# Patient Record
Sex: FEMALE | Race: WHITE | NOT HISPANIC OR LATINO | Employment: FULL TIME | URBAN - METROPOLITAN AREA
[De-identification: names, ages, dates, MRNs, and addresses within clinical notes are randomized per-mention and may not be internally consistent; named-entity substitution may affect disease eponyms.]

---

## 2021-02-02 ENCOUNTER — HOSPITAL ENCOUNTER (INPATIENT)
Facility: HOSPITAL | Age: 56
LOS: 4 days | Discharge: HOME | End: 2021-02-06
Attending: EMERGENCY MEDICINE | Admitting: INTERNAL MEDICINE

## 2021-02-02 DIAGNOSIS — R03.0 ELEVATED BLOOD PRESSURE READING: ICD-10-CM

## 2021-02-02 DIAGNOSIS — F32.A DEPRESSION WITH SUICIDAL IDEATION: ICD-10-CM

## 2021-02-02 DIAGNOSIS — F10.10 ALCOHOL ABUSE: ICD-10-CM

## 2021-02-02 DIAGNOSIS — F10.932: Primary | ICD-10-CM

## 2021-02-02 DIAGNOSIS — R45.851 DEPRESSION WITH SUICIDAL IDEATION: ICD-10-CM

## 2021-02-02 PROBLEM — F10.939 ALCOHOL WITHDRAWAL (HCC): Status: ACTIVE | Noted: 2021-02-02

## 2021-02-02 PROBLEM — F17.200 SMOKING: Chronic | Status: ACTIVE | Noted: 2021-02-02

## 2021-02-02 LAB
ALBUMIN SERPL-MCNC: 4.8 G/DL (ref 3.5–5.2)
ALBUMIN/GLOB SERPL: 1.8 G/DL
ALP SERPL-CCNC: 108 U/L (ref 39–117)
ALT SERPL W P-5'-P-CCNC: 73 U/L (ref 1–33)
AMPHET+METHAMPHET UR QL: NEGATIVE
AMPHETAMINES UR QL: NEGATIVE
ANION GAP SERPL CALCULATED.3IONS-SCNC: 15 MMOL/L (ref 5–15)
APAP SERPL-MCNC: <5 MCG/ML (ref 0–30)
AST SERPL-CCNC: 82 U/L (ref 1–32)
B-HCG UR QL: NEGATIVE
BACTERIA UR QL AUTO: NORMAL /HPF
BARBITURATES UR QL SCN: NEGATIVE
BASOPHILS # BLD AUTO: 0.02 10*3/MM3 (ref 0–0.2)
BASOPHILS NFR BLD AUTO: 0.5 % (ref 0–1.5)
BENZODIAZ UR QL SCN: NEGATIVE
BILIRUB SERPL-MCNC: 0.8 MG/DL (ref 0–1.2)
BILIRUB UR QL STRIP: NEGATIVE
BUN SERPL-MCNC: 9 MG/DL (ref 6–20)
BUN/CREAT SERPL: 11 (ref 7–25)
BUPRENORPHINE SERPL-MCNC: NEGATIVE NG/ML
CALCIUM SPEC-SCNC: 10.8 MG/DL (ref 8.6–10.5)
CANNABINOIDS SERPL QL: NEGATIVE
CHLORIDE SERPL-SCNC: 94 MMOL/L (ref 98–107)
CLARITY UR: ABNORMAL
CO2 SERPL-SCNC: 29 MMOL/L (ref 22–29)
COCAINE UR QL: NEGATIVE
COLOR UR: YELLOW
CREAT SERPL-MCNC: 0.82 MG/DL (ref 0.57–1)
D-LACTATE SERPL-SCNC: 0.9 MMOL/L (ref 0.5–2)
D-LACTATE SERPL-SCNC: 4 MMOL/L (ref 0.5–2)
DEPRECATED RDW RBC AUTO: 42.1 FL (ref 37–54)
EOSINOPHIL # BLD AUTO: 0.01 10*3/MM3 (ref 0–0.4)
EOSINOPHIL NFR BLD AUTO: 0.3 % (ref 0.3–6.2)
ERYTHROCYTE [DISTWIDTH] IN BLOOD BY AUTOMATED COUNT: 13.1 % (ref 12.3–15.4)
ETHANOL BLD-MCNC: <10 MG/DL (ref 0–10)
GFR SERPL CREATININE-BSD FRML MDRD: 72 ML/MIN/1.73
GLOBULIN UR ELPH-MCNC: 2.7 GM/DL
GLUCOSE SERPL-MCNC: 111 MG/DL (ref 65–99)
GLUCOSE UR STRIP-MCNC: NEGATIVE MG/DL
HCT VFR BLD AUTO: 45.6 % (ref 34–46.6)
HGB BLD-MCNC: 15.3 G/DL (ref 12–15.9)
HGB UR QL STRIP.AUTO: NEGATIVE
HOLD SPECIMEN: NORMAL
HYALINE CASTS UR QL AUTO: NORMAL /LPF
IMM GRANULOCYTES # BLD AUTO: 0.01 10*3/MM3 (ref 0–0.05)
IMM GRANULOCYTES NFR BLD AUTO: 0.3 % (ref 0–0.5)
INTERNAL NEGATIVE CONTROL: NEGATIVE
INTERNAL POSITIVE CONTROL: POSITIVE
KETONES UR QL STRIP: NEGATIVE
LACTATE HOLD SPECIMEN: NORMAL
LEUKOCYTE ESTERASE UR QL STRIP.AUTO: ABNORMAL
LIPASE SERPL-CCNC: 67 U/L (ref 13–60)
LYMPHOCYTES # BLD AUTO: 0.86 10*3/MM3 (ref 0.7–3.1)
LYMPHOCYTES NFR BLD AUTO: 23.1 % (ref 19.6–45.3)
Lab: NORMAL
MCH RBC QN AUTO: 29.4 PG (ref 26.6–33)
MCHC RBC AUTO-ENTMCNC: 33.6 G/DL (ref 31.5–35.7)
MCV RBC AUTO: 87.5 FL (ref 79–97)
METHADONE UR QL SCN: NEGATIVE
MONOCYTES # BLD AUTO: 0.45 10*3/MM3 (ref 0.1–0.9)
MONOCYTES NFR BLD AUTO: 12.1 % (ref 5–12)
NEUTROPHILS NFR BLD AUTO: 2.37 10*3/MM3 (ref 1.7–7)
NEUTROPHILS NFR BLD AUTO: 63.7 % (ref 42.7–76)
NITRITE UR QL STRIP: NEGATIVE
NRBC BLD AUTO-RTO: 0 /100 WBC (ref 0–0.2)
OPIATES UR QL: NEGATIVE
OXYCODONE UR QL SCN: NEGATIVE
PCP UR QL SCN: NEGATIVE
PH UR STRIP.AUTO: 8.5 [PH] (ref 5–8)
PLATELET # BLD AUTO: 146 10*3/MM3 (ref 140–450)
PMV BLD AUTO: 9.4 FL (ref 6–12)
POTASSIUM SERPL-SCNC: 4.1 MMOL/L (ref 3.5–5.2)
PROPOXYPH UR QL: NEGATIVE
PROT SERPL-MCNC: 7.5 G/DL (ref 6–8.5)
PROT UR QL STRIP: NEGATIVE
RBC # BLD AUTO: 5.21 10*6/MM3 (ref 3.77–5.28)
RBC # UR: NORMAL /HPF
REF LAB TEST METHOD: NORMAL
SALICYLATES SERPL-MCNC: <0.3 MG/DL
SODIUM SERPL-SCNC: 138 MMOL/L (ref 136–145)
SP GR UR STRIP: 1.01 (ref 1–1.03)
SQUAMOUS #/AREA URNS HPF: NORMAL /HPF
TRICYCLICS UR QL SCN: NEGATIVE
UROBILINOGEN UR QL STRIP: ABNORMAL
WBC # BLD AUTO: 3.72 10*3/MM3 (ref 3.4–10.8)
WBC UR QL AUTO: NORMAL /HPF
WHOLE BLOOD HOLD SPECIMEN: NORMAL
WHOLE BLOOD HOLD SPECIMEN: NORMAL

## 2021-02-02 PROCEDURE — 80143 DRUG ASSAY ACETAMINOPHEN: CPT | Performed by: EMERGENCY MEDICINE

## 2021-02-02 PROCEDURE — 83605 ASSAY OF LACTIC ACID: CPT | Performed by: EMERGENCY MEDICINE

## 2021-02-02 PROCEDURE — 25010000002 DIAZEPAM PER 5 MG: Performed by: EMERGENCY MEDICINE

## 2021-02-02 PROCEDURE — 87636 SARSCOV2 & INF A&B AMP PRB: CPT | Performed by: INTERNAL MEDICINE

## 2021-02-02 PROCEDURE — 81025 URINE PREGNANCY TEST: CPT | Performed by: EMERGENCY MEDICINE

## 2021-02-02 PROCEDURE — 83605 ASSAY OF LACTIC ACID: CPT

## 2021-02-02 PROCEDURE — 99284 EMERGENCY DEPT VISIT MOD MDM: CPT

## 2021-02-02 PROCEDURE — 85025 COMPLETE CBC W/AUTO DIFF WBC: CPT

## 2021-02-02 PROCEDURE — 82077 ASSAY SPEC XCP UR&BREATH IA: CPT

## 2021-02-02 PROCEDURE — 25010000002 THIAMINE PER 100 MG: Performed by: EMERGENCY MEDICINE

## 2021-02-02 PROCEDURE — 80053 COMPREHEN METABOLIC PANEL: CPT

## 2021-02-02 PROCEDURE — 99283 EMERGENCY DEPT VISIT LOW MDM: CPT

## 2021-02-02 PROCEDURE — 99223 1ST HOSP IP/OBS HIGH 75: CPT | Performed by: INTERNAL MEDICINE

## 2021-02-02 PROCEDURE — 83690 ASSAY OF LIPASE: CPT

## 2021-02-02 PROCEDURE — 81001 URINALYSIS AUTO W/SCOPE: CPT | Performed by: EMERGENCY MEDICINE

## 2021-02-02 PROCEDURE — 80306 DRUG TEST PRSMV INSTRMNT: CPT | Performed by: EMERGENCY MEDICINE

## 2021-02-02 PROCEDURE — 25010000002 LORAZEPAM PER 2 MG: Performed by: EMERGENCY MEDICINE

## 2021-02-02 PROCEDURE — 80179 DRUG ASSAY SALICYLATE: CPT | Performed by: EMERGENCY MEDICINE

## 2021-02-02 RX ORDER — LORAZEPAM 2 MG/ML
1 INJECTION INTRAMUSCULAR ONCE
Status: COMPLETED | OUTPATIENT
Start: 2021-02-02 | End: 2021-02-02

## 2021-02-02 RX ORDER — LORAZEPAM 2 MG/ML
0.5 INJECTION INTRAMUSCULAR EVERY 8 HOURS
Status: DISCONTINUED | OUTPATIENT
Start: 2021-02-04 | End: 2021-02-03

## 2021-02-02 RX ORDER — ESCITALOPRAM OXALATE 10 MG/1
10 TABLET ORAL DAILY
COMMUNITY
End: 2021-02-06 | Stop reason: HOSPADM

## 2021-02-02 RX ORDER — LORAZEPAM 2 MG/ML
1 INJECTION INTRAMUSCULAR
Status: DISCONTINUED | OUTPATIENT
Start: 2021-02-02 | End: 2021-02-03

## 2021-02-02 RX ORDER — DIAZEPAM 5 MG/ML
5 INJECTION, SOLUTION INTRAMUSCULAR; INTRAVENOUS ONCE
Status: COMPLETED | OUTPATIENT
Start: 2021-02-02 | End: 2021-02-02

## 2021-02-02 RX ORDER — LORAZEPAM 2 MG/ML
0.5 INJECTION INTRAMUSCULAR EVERY 6 HOURS SCHEDULED
Status: DISCONTINUED | OUTPATIENT
Start: 2021-02-02 | End: 2021-02-03

## 2021-02-02 RX ORDER — LORAZEPAM 0.5 MG/1
0.5 TABLET ORAL
Status: DISCONTINUED | OUTPATIENT
Start: 2021-02-02 | End: 2021-02-06 | Stop reason: HOSPADM

## 2021-02-02 RX ORDER — LORAZEPAM 2 MG/ML
0.5 INJECTION INTRAMUSCULAR
Status: DISCONTINUED | OUTPATIENT
Start: 2021-02-02 | End: 2021-02-03

## 2021-02-02 RX ORDER — SODIUM CHLORIDE 9 MG/ML
10 INJECTION INTRAVENOUS AS NEEDED
Status: DISCONTINUED | OUTPATIENT
Start: 2021-02-02 | End: 2021-02-06 | Stop reason: HOSPADM

## 2021-02-02 RX ORDER — LORAZEPAM 1 MG/1
1 TABLET ORAL
Status: DISCONTINUED | OUTPATIENT
Start: 2021-02-02 | End: 2021-02-06 | Stop reason: HOSPADM

## 2021-02-02 RX ADMIN — LORAZEPAM 1 MG: 2 INJECTION INTRAMUSCULAR; INTRAVENOUS at 22:52

## 2021-02-02 RX ADMIN — DIAZEPAM 5 MG: 5 INJECTION, SOLUTION INTRAMUSCULAR; INTRAVENOUS at 18:13

## 2021-02-02 RX ADMIN — LORAZEPAM 1 MG: 2 INJECTION INTRAMUSCULAR; INTRAVENOUS at 19:24

## 2021-02-02 RX ADMIN — SODIUM CHLORIDE, POTASSIUM CHLORIDE, SODIUM LACTATE AND CALCIUM CHLORIDE 1000 ML: 600; 310; 30; 20 INJECTION, SOLUTION INTRAVENOUS at 18:08

## 2021-02-02 RX ADMIN — THIAMINE HYDROCHLORIDE 1000 ML/HR: 100 INJECTION, SOLUTION INTRAMUSCULAR; INTRAVENOUS at 18:37

## 2021-02-02 NOTE — PROGRESS NOTES
"Continued Stay Note  [facility]     Patient Name: Tierney Mas  MRN: [id number]  Today's Date: 2/2/2021    Admit Date: 2/2/2021    Discharge Plan     Row Name 02/02/21 1840       Plan    Plan  Ongoing    Plan Comments  Followed up with patient at bedside. She stated that she is feeling better; however, she still has visible tremors. She is still unwilling to pursue inpatient AUD treatment. I did explain Vivitrol and the benefits of using it for AUD. I provided her with a packet and ensured the clinics that provide Vivitrol were marked. We also discussed [facility] and their services again. The patient was willing to sign up for Telephone Recovery Support and a . Provided Dr. Ram with the information. He stated he believes she will need to be admitted to the hospital for at least one night of observation. He will be completing a CIWA. I went back and spoke to the patient who is agreeable with being admitted, if it is determined to be necessary for her health.    Row Name 02/02/21 4093       Plan    Plan Ongoing    Plan Comments Spoke to patient in Consult Room. She was in a wheelchair due to being unsteady walking. She has visible tremors, over most of her body. She complained of being cold, but also sweating. She reported that she went to AUD treatment in 2014. She was able to maintain five years of sobriety; however, she then \"slipped up\" about a year ago. She stated this was due to her anxiety. She admitted that she has been drinking a case of White Claws every day. She did not feel this would be a problem due to the alcohol content. She said that a friend came over this past weekend and she also drank an undetermined amout of whiskey. She has not been feeling well since then. She is not interested in inpatient AUD treatment. 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 We briefly discussed other AUD treatment options, including the services " offered by [facility]. I will continue to follow her througout her stay in the ED.        Discharge Codes    No documentation.             Raiza Villalobos     Chemical Dependency Team  Ext. [phone]

## 2021-02-02 NOTE — PROGRESS NOTES
"Continued Stay Note  [facility]     Patient Name: Tierney Mas  MRN: [id number]  Today's Date: 2/2/2021    Admit Date: (Not on file)    Discharge Plan     Row Name 02/02/21 8959       Plan    Plan Ongoing    Plan Comments Spoke to patient in Consult Room. She was in a wheelchair due to being unsteady walking. She has visible tremors, over most of her body. She complained of being cold, but also sweating. She reported that she went to AUD treatment in 2014. She was able to maintain five years of sobriety; however, she then \"slipped up\" about a year ago. She stated this was due to her anxiety. She admitted that she has been drinking a case of White Claws every day. She did not feel this would be a problem due to the alcohol content. She said that a friend came over this past weekend and she also drank an undetermined amout of whiskey. She has not been feeling well since then. She is not interested in inpatient AUD treatment. 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 We briefly discussed other AUD treatment options, including the services offered by [facility]. I will continue to follow her througout her stay in the ED.    She also reported that she had surgery on her foot in December 2020. She has been staying at home alone, which is also a factor in her alcohol consumption.        Discharge Codes    No documentation.             Raiza Villalobos     Chemical Dependency Team  Ext. [phone]    "

## 2021-02-03 ENCOUNTER — APPOINTMENT (OUTPATIENT)
Dept: GENERAL RADIOLOGY | Facility: HOSPITAL | Age: 56
End: 2021-02-03

## 2021-02-03 ENCOUNTER — TELEPHONE (OUTPATIENT)
Dept: PSYCHIATRY | Facility: CLINIC | Age: 56
End: 2021-02-03

## 2021-02-03 PROBLEM — R45.851 SUICIDAL IDEATIONS: Status: ACTIVE | Noted: 2021-02-03

## 2021-02-03 PROBLEM — F32.A DEPRESSION: Status: ACTIVE | Noted: 2021-02-03

## 2021-02-03 LAB
ALBUMIN SERPL-MCNC: 4 G/DL (ref 3.5–5.2)
ALBUMIN/GLOB SERPL: 1.9 G/DL
ALP SERPL-CCNC: 87 U/L (ref 39–117)
ALT SERPL W P-5'-P-CCNC: 180 U/L (ref 1–33)
ANION GAP SERPL CALCULATED.3IONS-SCNC: 11 MMOL/L (ref 5–15)
AST SERPL-CCNC: 400 U/L (ref 1–32)
BASOPHILS # BLD AUTO: 0.03 10*3/MM3 (ref 0–0.2)
BASOPHILS NFR BLD AUTO: 0.6 % (ref 0–1.5)
BILIRUB SERPL-MCNC: 0.9 MG/DL (ref 0–1.2)
BUN SERPL-MCNC: 11 MG/DL (ref 6–20)
BUN/CREAT SERPL: 14.7 (ref 7–25)
CALCIUM SPEC-SCNC: 9.3 MG/DL (ref 8.6–10.5)
CHLORIDE SERPL-SCNC: 103 MMOL/L (ref 98–107)
CO2 SERPL-SCNC: 27 MMOL/L (ref 22–29)
CREAT SERPL-MCNC: 0.75 MG/DL (ref 0.57–1)
DEPRECATED RDW RBC AUTO: 43.6 FL (ref 37–54)
EOSINOPHIL # BLD AUTO: 0.13 10*3/MM3 (ref 0–0.4)
EOSINOPHIL NFR BLD AUTO: 2.4 % (ref 0.3–6.2)
ERYTHROCYTE [DISTWIDTH] IN BLOOD BY AUTOMATED COUNT: 13.2 % (ref 12.3–15.4)
FLUAV RNA RESP QL NAA+PROBE: NOT DETECTED
FLUBV RNA RESP QL NAA+PROBE: NOT DETECTED
GFR SERPL CREATININE-BSD FRML MDRD: 80 ML/MIN/1.73
GLOBULIN UR ELPH-MCNC: 2.1 GM/DL
GLUCOSE SERPL-MCNC: 100 MG/DL (ref 65–99)
HCT VFR BLD AUTO: 42.3 % (ref 34–46.6)
HGB BLD-MCNC: 14.1 G/DL (ref 12–15.9)
IMM GRANULOCYTES # BLD AUTO: 0.01 10*3/MM3 (ref 0–0.05)
IMM GRANULOCYTES NFR BLD AUTO: 0.2 % (ref 0–0.5)
LYMPHOCYTES # BLD AUTO: 1.35 10*3/MM3 (ref 0.7–3.1)
LYMPHOCYTES NFR BLD AUTO: 24.8 % (ref 19.6–45.3)
MAGNESIUM SERPL-MCNC: 1.9 MG/DL (ref 1.6–2.6)
MCH RBC QN AUTO: 29.6 PG (ref 26.6–33)
MCHC RBC AUTO-ENTMCNC: 33.3 G/DL (ref 31.5–35.7)
MCV RBC AUTO: 88.9 FL (ref 79–97)
MONOCYTES # BLD AUTO: 0.23 10*3/MM3 (ref 0.1–0.9)
MONOCYTES NFR BLD AUTO: 4.2 % (ref 5–12)
NEUTROPHILS NFR BLD AUTO: 3.7 10*3/MM3 (ref 1.7–7)
NEUTROPHILS NFR BLD AUTO: 67.8 % (ref 42.7–76)
NRBC BLD AUTO-RTO: 0 /100 WBC (ref 0–0.2)
PLATELET # BLD AUTO: 128 10*3/MM3 (ref 140–450)
PMV BLD AUTO: 10.5 FL (ref 6–12)
POTASSIUM SERPL-SCNC: 4 MMOL/L (ref 3.5–5.2)
PROT SERPL-MCNC: 6.1 G/DL (ref 6–8.5)
RBC # BLD AUTO: 4.76 10*6/MM3 (ref 3.77–5.28)
SARS-COV-2 RNA RESP QL NAA+PROBE: NOT DETECTED
SODIUM SERPL-SCNC: 141 MMOL/L (ref 136–145)
WBC # BLD AUTO: 5.45 10*3/MM3 (ref 3.4–10.8)

## 2021-02-03 PROCEDURE — 83735 ASSAY OF MAGNESIUM: CPT | Performed by: PHYSICIAN ASSISTANT

## 2021-02-03 PROCEDURE — 25010000002 LORAZEPAM PER 2 MG: Performed by: INTERNAL MEDICINE

## 2021-02-03 PROCEDURE — 71045 X-RAY EXAM CHEST 1 VIEW: CPT

## 2021-02-03 PROCEDURE — 99254 IP/OBS CNSLTJ NEW/EST MOD 60: CPT | Performed by: NURSE PRACTITIONER

## 2021-02-03 PROCEDURE — 99233 SBSQ HOSP IP/OBS HIGH 50: CPT | Performed by: INTERNAL MEDICINE

## 2021-02-03 PROCEDURE — 97165 OT EVAL LOW COMPLEX 30 MIN: CPT

## 2021-02-03 PROCEDURE — 85025 COMPLETE CBC W/AUTO DIFF WBC: CPT | Performed by: PHYSICIAN ASSISTANT

## 2021-02-03 PROCEDURE — 97162 PT EVAL MOD COMPLEX 30 MIN: CPT

## 2021-02-03 PROCEDURE — 80053 COMPREHEN METABOLIC PANEL: CPT | Performed by: PHYSICIAN ASSISTANT

## 2021-02-03 RX ORDER — FOLIC ACID 1 MG/1
1 TABLET ORAL DAILY
Status: DISCONTINUED | OUTPATIENT
Start: 2021-02-03 | End: 2021-02-06 | Stop reason: HOSPADM

## 2021-02-03 RX ORDER — ESCITALOPRAM OXALATE 10 MG/1
10 TABLET ORAL DAILY
Status: DISCONTINUED | OUTPATIENT
Start: 2021-02-03 | End: 2021-02-04

## 2021-02-03 RX ORDER — NICOTINE 21 MG/24HR
1 PATCH, TRANSDERMAL 24 HOURS TRANSDERMAL
Status: DISCONTINUED | OUTPATIENT
Start: 2021-02-03 | End: 2021-02-06 | Stop reason: HOSPADM

## 2021-02-03 RX ORDER — SODIUM CHLORIDE 0.9 % (FLUSH) 0.9 %
10 SYRINGE (ML) INJECTION AS NEEDED
Status: DISCONTINUED | OUTPATIENT
Start: 2021-02-03 | End: 2021-02-06 | Stop reason: HOSPADM

## 2021-02-03 RX ORDER — ACETAMINOPHEN 325 MG/1
650 TABLET ORAL EVERY 4 HOURS PRN
Status: DISCONTINUED | OUTPATIENT
Start: 2021-02-03 | End: 2021-02-06 | Stop reason: HOSPADM

## 2021-02-03 RX ORDER — SODIUM CHLORIDE 0.9 % (FLUSH) 0.9 %
10 SYRINGE (ML) INJECTION EVERY 12 HOURS SCHEDULED
Status: DISCONTINUED | OUTPATIENT
Start: 2021-02-03 | End: 2021-02-06 | Stop reason: HOSPADM

## 2021-02-03 RX ORDER — ONDANSETRON 2 MG/ML
4 INJECTION INTRAMUSCULAR; INTRAVENOUS EVERY 6 HOURS PRN
Status: DISCONTINUED | OUTPATIENT
Start: 2021-02-03 | End: 2021-02-06 | Stop reason: HOSPADM

## 2021-02-03 RX ORDER — MULTIPLE VITAMINS W/ MINERALS TAB 9MG-400MCG
1 TAB ORAL DAILY
Status: DISCONTINUED | OUTPATIENT
Start: 2021-02-03 | End: 2021-02-06 | Stop reason: HOSPADM

## 2021-02-03 RX ORDER — LORAZEPAM 2 MG/ML
1 INJECTION INTRAMUSCULAR ONCE
Status: COMPLETED | OUTPATIENT
Start: 2021-02-03 | End: 2021-02-03

## 2021-02-03 RX ORDER — ACETAMINOPHEN 160 MG/5ML
650 SOLUTION ORAL EVERY 4 HOURS PRN
Status: DISCONTINUED | OUTPATIENT
Start: 2021-02-03 | End: 2021-02-06 | Stop reason: HOSPADM

## 2021-02-03 RX ORDER — ACETAMINOPHEN 650 MG/1
650 SUPPOSITORY RECTAL EVERY 4 HOURS PRN
Status: DISCONTINUED | OUTPATIENT
Start: 2021-02-03 | End: 2021-02-06 | Stop reason: HOSPADM

## 2021-02-03 RX ORDER — DIAZEPAM 2 MG/1
2 TABLET ORAL EVERY 8 HOURS PRN
Status: DISCONTINUED | OUTPATIENT
Start: 2021-02-03 | End: 2021-02-06 | Stop reason: HOSPADM

## 2021-02-03 RX ADMIN — LORAZEPAM 0.5 MG: 0.5 TABLET ORAL at 19:51

## 2021-02-03 RX ADMIN — SODIUM CHLORIDE, PRESERVATIVE FREE 10 ML: 5 INJECTION INTRAVENOUS at 19:52

## 2021-02-03 RX ADMIN — LORAZEPAM 0.5 MG: 0.5 TABLET ORAL at 16:11

## 2021-02-03 RX ADMIN — LORAZEPAM 1 MG: 1 TABLET ORAL at 23:53

## 2021-02-03 RX ADMIN — LORAZEPAM 0.5 MG: 2 INJECTION INTRAMUSCULAR; INTRAVENOUS at 00:20

## 2021-02-03 RX ADMIN — MULTIPLE VITAMINS W/ MINERALS TAB 1 TABLET: TAB at 11:56

## 2021-02-03 RX ADMIN — LORAZEPAM 1 MG: 2 INJECTION INTRAMUSCULAR; INTRAVENOUS at 00:56

## 2021-02-03 RX ADMIN — THIAMINE HCL TAB 100 MG 100 MG: 100 TAB at 11:55

## 2021-02-03 RX ADMIN — FOLIC ACID 1 MG: 1 TABLET ORAL at 11:55

## 2021-02-03 RX ADMIN — ESCITALOPRAM OXALATE 10 MG: 10 TABLET ORAL at 09:22

## 2021-02-03 RX ADMIN — LORAZEPAM 0.5 MG: 0.5 TABLET ORAL at 11:55

## 2021-02-03 RX ADMIN — SODIUM CHLORIDE, PRESERVATIVE FREE 10 ML: 5 INJECTION INTRAVENOUS at 09:24

## 2021-02-03 RX ADMIN — Medication 1 PATCH: at 03:54

## 2021-02-03 NOTE — H&P
[facility] Medicine Services  HISTORY AND PHYSICAL    Patient Name: Tierney Mas  : 1965  MRN: [id number]  Primary Care Physician: Provider, No Known  Date of admission: 2021    Subjective   Subjective     Chief Complaint:  ETOH withdrawal w/ SI    HPI:  Tierney Mas is a 55 y.o. female with a history of anxiety/depression, ETOH abuse, prior SI who presents to the ED from home for alcohol withdrawal. Pt states she completed a voluntary rehab in  but never went through withdrawal. She relapsed about a year ago and has been drinking a case of white claws every day, and then this past weekend, a friend came over and they drank an undetermined amount of whiskey. She states they both felt sick afterwards and she has not had a drink since them. She presents to the ED this evening with her sister who came from [place] to help her.  She was seen by the Chemical  in the ED, and offered outpatient resources and she has declined so far.  She also admits to a prior suicide attempt and she has had suicidal thoughts this past weekend while drinking.  She was placed on a 72 hour hold by ER physician.     In the ED, patient has visible tremors and noted to be diaphoretic. She has received ativan 2 mg, valium 5 mg IV, LR 1L, banana bag.   Labs reviewed and significant for lactate 4.0, lipase 67, ALT 73, AST 82, WBC 3.72, UDS neg. COVID screen pending.    Hospital medicine will admit for further evaluation and treatment.        Current COVID Risks are:  [] Fever []  Cough [] Shortness of breath [] Fatigue [] Change in taste or smell    [] Exposure to COVID positive patient  [] High risk facility   [x]  NONE    Review of Systems   Constitutional: Positive for appetite change, chills and diaphoresis.   HENT: Negative.    Eyes: Negative.    Respiratory: Negative.    Cardiovascular: Negative.    Gastrointestinal: Positive for abdominal pain and nausea.   Endocrine:  Negative.    Genitourinary: Negative.    Musculoskeletal: Negative.    Skin: Negative.    Allergic/Immunologic: Negative.    Neurological: Positive for tremors and light-headedness.   Hematological: Negative.    Psychiatric/Behavioral: Positive for agitation and suicidal ideas. The patient is nervous/anxious.         All other systems reviewed and are negative.     Personal History     Past Medical History:   Diagnosis Date   • Anxiety and depression        Past Surgical History:   Procedure Laterality Date   • BUNIONECTOMY     • FOOT FRACTURE SURGERY     • WRIST SURGERY Bilateral        Family History: family history is not on file. Otherwise pertinent FHx was reviewed and unremarkable.     Social History:  reports that she has been smoking cigarettes. She has been smoking about 1.00 pack per day. She has never used smokeless tobacco. She reports current alcohol use. She reports that she does not use drugs.  Social History     Social History Narrative   • Not on file       Medications:  escitalopram    No Known Allergies    Objective   Objective     Vital Signs:   Temp:  [97.4 °F (36.3 °C)] 97.4 °F (36.3 °C)  Heart Rate:  [74-96] 93  Resp:  [18] 18  BP: (118-145)/() 139/99    Physical Exam   Constitutional: anxious, alert  Eyes: PERRLA, sclerae anicteric, no conjunctival injection  HENT: NCAT, mucous membranes moist  Neck: Supple, no thyromegaly, no lymphadenopathy, trachea midline  Respiratory: Clear to auscultation bilaterally, nonlabored respirations   Cardiovascular: RRR, no murmurs, rubs, or gallops, palpable pedal pulses bilaterally  Gastrointestinal: Positive bowel sounds, soft, nontender, nondistended  Musculoskeletal: No bilateral ankle edema, no clubbing or cyanosis to extremities  Psychiatric: Appropriate affect, cooperative  Neurologic: Oriented x 3, strength symmetric in all extremities, Cranial Nerves grossly intact to confrontation, speech clear, bilateral upper extremity tremors  Skin: No  mike      Results Reviewed:  I have personally reviewed most recent indicated data and agree with findings including:  [x]  Laboratory  [x]  Radiology  [x]  EKG/Telemetry  []  Pathology  []  Cardiac/Vascular Studies  [x]  Old records  []  Other:  Most pertinent findings include:      LAB RESULTS:      Lab 02/02/21 2032 02/02/21  1635   WBC  --  3.72   HEMOGLOBIN  --  15.3   HEMATOCRIT  --  45.6   PLATELETS  --  146   NEUTROS ABS  --  2.37   IMMATURE GRANS (ABS)  --  0.01   LYMPHS ABS  --  0.86   MONOS ABS  --  0.45   EOS ABS  --  0.01   MCV  --  87.5   LACTATE 0.9 4.0*         Lab 02/02/21  1635   SODIUM 138   POTASSIUM 4.1   CHLORIDE 94*   CO2 29.0   ANION GAP 15.0   BUN 9   CREATININE 0.82   GLUCOSE 111*   CALCIUM 10.8*         Lab 02/02/21  1635   TOTAL PROTEIN 7.5   ALBUMIN 4.80   GLOBULIN 2.7   ALT (SGPT) 73*   AST (SGOT) 82*   BILIRUBIN 0.8   ALK PHOS 108   LIPASE 67*                     Brief Urine Lab Results  (Last result in the past 365 days)      Color   Clarity   Blood   Leuk Est   Nitrite   Protein   CREAT   Urine HCG        02/02/21 1819               Negative         Microbiology Results (last 10 days)     ** No results found for the last 240 hours. **          No radiology results from the last 24 hrs         Assessment/Plan   Assessment & Plan       Alcohol withdrawal (CMS/HCC)    Smoking    Alcohol abuse    Alcohol withdrawal with perceptual disturbances (CMS/[facility])    Depression    Suicidal ideations      1. ETOH withdrawal      Suicidal Ideation  - continue PO valium, CIWA protocol  - neuro checks, seizure precautions  - psych consult, cont 72 hr hold, sitter  - banana bag x 3 days  - SW consult for discharge planning    2. Anxiety/depression  - cont lexapro    3. Tobacco abuse  - nicotine patch    4. Lactic acidosis  - likely due to volume depletion  - continue fluids, reflex pending    DVT prophylaxis:  ambulatory      CODE STATUS:  Full code  There are no questions and answers to display.              This note has been completed as part of a split-shared workflow.     Signature: Electronically signed by QUENTIN Theodore, 02/03/21, 1:07 AM EST      Brief Attending Admission Attestation     I have seen and examined the patient, performing an independent face-to-face diagnostic evaluation with plan of care reviewed and developed with the advanced practice clinician (APC).    Old records reviewed and summarized in PM hx.    Brief Summary Statement:  55-year-old female, with long term use of alcoholic, last rehab in 2014.  Over past year-patient again started drinking, every day, case of white claws.  Presented to ED today for some help, initial CIWA scale was 16.  Patient declined inpatient rehab, with[facility] consult.  While in ED, patient's family (sister) showed up stating patient had suicidal ideation, also patient has a gun at home.  Patient currently is declining any suicidal thoughts.  Patient has been put on 72 hours hold,  Her last drink was approximately 3 days back.      In ED patient got Ativan 1 mg twice, Ringer lactate, rally pack,  Remainder of detailed HPI is as noted above and has been reviewed and/or edited by me for completeness.    PM HX:  Surgery on the foot-December 2020        Vitals:    02/02/21 2255   BP: 124/92   Pulse: 78   Resp: 18   Temp:  Afebrile.   SpO2: 97%       Attending Physical Exam:  RS- CTA-BL, ,  No wheezing , no crackles, good effort.  CVS- s1s2 regular, tachycardic no murmur.  ABD- soft, non tender, not distended, no organomegaly.  EXT- no edema.  NEURO-confused, tremors in upper extremity      LABS:  Lipase 67 with mildly elevated LFTs, initial lactic acid 4.0-repeat 0.9,  UA-negative  Urine drug screen-negative  Alcohol level less than 10  Covid swab pending-    Brief Assessment/Plan      See above for further detailed assessment and plan developed with APC which I have reviewed and/or edited for completeness.      Admission Status: I believe that this patient meets  inpatient status secondary to active alcohol withdrawal.        \

## 2021-02-03 NOTE — THERAPY EVALUATION
Patient Name: Tierney Mas  : 1965    MRN: [id number]                              Today's Date: 2/3/2021       Admit Date: 2021    Visit Dx:     ICD-10-CM ICD-9-CM   1. Alcohol withdrawal with perceptual disturbances (CMS/HCC)  F10.232 291.81   2. Alcohol abuse  F10.10 305.00   3. Elevated blood pressure reading  R03.0 796.2   4. Depression with suicidal ideation  F32.9 311    R45.851 V62.84     Patient Active Problem List   Diagnosis   • Alcohol withdrawal (CMS/HCC)   • Smoking   • Alcohol abuse   • Alcohol withdrawal with perceptual disturbances (CMS/HCC)   • Depression   • Suicidal ideations     Past Medical History:   Diagnosis Date   • Anxiety and depression      Past Surgical History:   Procedure Laterality Date   • BUNIONECTOMY     • FOOT FRACTURE SURGERY     • WRIST SURGERY Bilateral      General Information     Row Name 21 1044          OT Time and Intention    Document Type  evaluation  -CS     Mode of Treatment  occupational therapy  -CS     Row Name 21 1044          General Information    Patient Profile Reviewed  yes  -CS     Prior Level of Function  independent:;ADL's;all household mobility;community mobility  -CS     Existing Precautions/Restrictions  fall;other (see comments) tremors  -CS     Barriers to Rehab  ineffective coping  -CS     Row Name 21 1044          Living Environment    Lives With  alone  -CS     Row Name 21 1044          Home Main Entrance    Number of Stairs, Main Entrance  four  -CS     Stair Railings, Main Entrance  railing on left side (ascending)  -CS     Row Name 21 1044          Stairs Within Home, Primary    Stairs, Within Home, Primary  one level home  -CS     Number of Stairs, Within Home, Primary  none  -CS     Row Name 21 1044          Cognition    Orientation Status (Cognition)  oriented x 3  -CS     Row Name 21 1044          Safety Issues, Functional Mobility    Safety Issues Affecting Function (Mobility)   awareness of need for assistance;insight into deficits/self-awareness;safety precautions follow-through/compliance;safety precaution awareness  -CS     Impairments Affecting Function (Mobility)  balance;coordination;endurance/activity tolerance;strength  -CS       User Key  (r) = Recorded By, (t) = Taken By, (c) = Cosigned By    Initials Name Provider Type    CS Mnotez Winn OT Occupational Therapist          Mobility/ADL's     Row Name 02/03/21 1046          Bed Mobility    Bed Mobility  rolling left;supine-sit;scooting/bridging  -CS     Rolling Left [place] (Bed Mobility)  standby assist  -CS     Scooting/Bridging [place] (Bed Mobility)  standby assist  -CS     Supine-Sit [place] (Bed Mobility)  standby assist  -CS     Comment (Bed Mobility)  HOB lowered to simulate home environment  -CS     Row Name 02/03/21 1046          Transfers    Transfers  bed-chair transfer;sit-stand transfer  -CS     Bed-Chair [place] (Transfers)  contact guard  -CS     Assistive Device (Bed-Chair Transfers)  other (see comments) hand held assist  -CS     Sit-Stand [place] (Transfers)  contact guard  -CS     Row Name 02/03/21 1046          Sit-Stand Transfer    Assistive Device (Sit-Stand Transfers)  other (see comments) hand held assist  -CS     Row Name 02/03/21 1046          Functional Mobility    Functional Mobility- Comment  Defer to PT  -CS     Row Name 02/03/21 1046          Activities of Daily Living    BADL Assessment/Intervention  grooming  -     Row Name 02/03/21 1046          Grooming Assessment/Training    [place] Level (Grooming)  hair care, combing/brushing;wash face, hands;independent;set up  -CS     Position (Grooming)  edge of bed sitting  -CS       User Key  (r) = Recorded By, (t) = Taken By, (c) = Cosigned By    Initials Name Provider Type    CS Montez Winn OT Occupational Therapist        Obj/Interventions     Row Name 02/03/21 1048          Sensory Assessment (Somatosensory)     Sensory Assessment (Somatosensory)  UE sensation intact  -[facility] Name 02/03/21 1048          Vision Assessment/Intervention    Visual Impairment/Limitations  WFL  -[facility] Name 02/03/21 1048          Range of Motion Comprehensive    General Range of Motion  no range of motion deficits identified  -[facility] Name 02/03/21 1048          Strength Comprehensive (MMT)    General Manual Muscle Testing (MMT) Assessment  upper extremity strength deficits identified  -     Comment, General Manual Muscle Testing (MMT) Assessment  BUE grossly 4-/5  -[facility] Name 02/03/21 1048          Balance    Balance Assessment  sitting static balance;sitting dynamic balance;standing dynamic balance;standing static balance  -     Static Sitting Balance  WFL;sitting, edge of bed  -     Dynamic Sitting Balance  mild impairment;sitting, edge of bed  -CS     Static Standing Balance  mild impairment;standing;supported  -CS     Dynamic Standing Balance  mild impairment;supported;standing  -       User Key  (r) = Recorded By, (t) = Taken By, (c) = Cosigned By    Initials Name Provider Type    Montez Guillen, OT Occupational Therapist        Goals/Plan     Row Name 02/03/21 1055          Bed Mobility Goal 1 (OT)    Activity/Assistive Device (Bed Mobility Goal 1, OT)  bed mobility activities, all  -CS     [place] Level/Cues Needed (Bed Mobility Goal 1, OT)  independent  -CS     Time Frame (Bed Mobility Goal 1, OT)  long term goal (LTG);10 days  -CS     Progress/Outcomes (Bed Mobility Goal 1, OT)  goal ongoing  -[facility] Name 02/03/21 1055          Transfer Goal 1 (OT)    Activity/Assistive Device (Transfer Goal 1, OT)  sit-to-stand/stand-to-sit;bed-to-chair/chair-to-bed;toilet  -CS     [place] Level/Cues Needed (Transfer Goal 1, OT)  independent  -CS     Time Frame (Transfer Goal 1, OT)  long term goal (LTG);10 days  -CS     Progress/Outcome (Transfer Goal 1, OT)  goal ongoing  -CS     Row Name 02/03/21 1057           Dressing Goal 1 (OT)    Activity/Device (Dressing Goal 1, OT)  lower body dressing  -CS     [place]/Cues Needed (Dressing Goal 1, OT)  independent  -CS     Time Frame (Dressing Goal 1, OT)  long term goal (LTG);10 days  -CS     Progress/Outcome (Dressing Goal 1, OT)  goal ongoing  -CS       User Key  (r) = Recorded By, (t) = Taken By, (c) = Cosigned By    Initials Name Provider Type    CS Montez Winn, OT Occupational Therapist        Clinical Impression     Row Name 02/03/21 1049          Pain Assessment    Additional Documentation  Pain Scale: Numbers Pre/Post-Treatment (Group)  -CS     Row Name 02/03/21 1049          Pain Scale: Numbers Pre/Post-Treatment    Pretreatment Pain Rating  0/10 - no pain  -CS     Posttreatment Pain Rating  0/10 - no pain  -CS     Pre/Posttreatment Pain Comment  tolerated eval  -CS     Pain Intervention(s)  Repositioned;Ambulation/increased activity  -CS     Row Name 02/03/21 1045          Plan of Care Review    Plan of Care Reviewed With  patient  -CS     Progress  no change  -CS     Outcome Summary  Initial OT evaluation completed. Pt presents w/ impaired balance, decreased activity tolerance, new onset tremors, and impaired balance warranting skilled OT services to promote return to PLOF and ADL independence. Pt was SBA for bed mobility, CGA and hand-held assist for STS/bed-chair transfers, and Ind/setup for seated grooming tasks. Once symptoms resolve, Pt will likely be safe to discharge to home - however will follow chem dependecy team's recommendations.  -CS     Row Name 02/03/21 1045          Therapy Assessment/Plan (OT)    Rehab Potential (OT)  good, to achieve stated therapy goals  -CS     Criteria for Skilled Therapeutic Interventions Met (OT)  yes;skilled treatment is necessary  -CS     Therapy Frequency (OT)  daily  -CS     Predicted Duration of Therapy Intervention (OT)  Return home  -CS     Row Name 02/03/21 1044          Therapy Plan Review/Discharge Plan  (OT)    Anticipated Discharge Disposition (OT)  home;home with assist  -CS     Row Name 02/03/21 1049          Vital Signs    Pre Systolic BP Rehab  -- RN cleared for eval, VSS  -CS     O2 Delivery Pre Treatment  room air  -CS     O2 Delivery Intra Treatment  room air  -CS     O2 Delivery Post Treatment  room air  -CS     Pre Patient Position  Supine  -CS     Intra Patient Position  Standing  -CS     Post Patient Position  Sitting  -CS     Row Name 02/03/21 1049          Positioning and Restraints    Pre-Treatment Position  in bed  -CS     Post Treatment Position  chair  -CS     In Chair  notified nsg;reclined;sitting;call light within reach;encouraged to call for assist;with nsg;waffle cushion;legs elevated w/ sitter  -CS       User Key  (r) = Recorded By, (t) = Taken By, (c) = Cosigned By    Initials Name Provider Type    Montez Guillen OT Occupational Therapist        Outcome Measures     Row Name 02/03/21 1055          How much help from another is currently needed...    Putting on and taking off regular lower body clothing?  3  -CS     Bathing (including washing, rinsing, and drying)  3  -CS     Toileting (which includes using toilet bed pan or urinal)  3  -CS     Putting on and taking off regular upper body clothing  4  -CS     Taking care of personal grooming (such as brushing teeth)  4  -CS     Eating meals  4  -CS     AM-PAC 6 Clicks Score (OT)  21  -CS     Row Name 02/03/21 1055          Functional Assessment    Outcome Measure Options  AM-PAC 6 Clicks Daily Activity (OT)  -CS       User Key  (r) = Recorded By, (t) = Taken By, (c) = Cosigned By    Initials Name Provider Type    Montez Guillen OT Occupational Therapist        Occupational Therapy Education                 Title: PT OT SLP Therapies (In Progress)     Topic: Occupational Therapy (In Progress)     Point: ADL training (Not Started)     Description:   Instruct learner(s) on proper safety adaptation and remediation techniques during  self care or transfers.   Instruct in proper use of assistive devices.              Learner Progress:  Not documented in this visit.          Point: Home exercise program (Not Started)     Description:   Instruct learner(s) on appropriate technique for monitoring, assisting and/or progressing therapeutic exercises/activities.              Learner Progress:  Not documented in this visit.          Point: Precautions (Done)     Description:   Instruct learner(s) on prescribed precautions during self-care and functional transfers.              Learning Progress Summary           Patient Acceptance, E, VU by  at 2/3/2021 1056    Comment: Reviewed safety precautions/awareness in room                   Point: Body mechanics (Done)     Description:   Instruct learner(s) on proper positioning and spine alignment during self-care, functional mobility activities and/or exercises.              Learning Progress Summary           Patient Acceptance, E, VU by  at 2/3/2021 1056    Comment: Reviewed safety precautions/awareness in room                               User Key     Initials Effective Dates Name Provider Type Discipline     10/21/20 -  Montez Winn OT Occupational Therapist OT              OT Recommendation and Plan  Therapy Frequency (OT): daily  Plan of Care Review  Plan of Care Reviewed With: patient  Progress: no change  Outcome Summary: Initial OT evaluation completed. Pt presents w/ impaired balance, decreased activity tolerance, new onset tremors, and impaired balance warranting skilled OT services to promote return to PLOF and ADL independence. Pt was SBA for bed mobility, CGA and hand-held assist for STS/bed-chair transfers, and Ind/setup for seated grooming tasks. Once symptoms resolve, Pt will likely be safe to discharge to home - however will follow chem dependecy team's recommendations.     Time Calculation:   Time Calculation- OT     Row Name 02/03/21 1058             Time Calculation- OT    OT  Start Time  0926  -      OT Received On  02/03/21  -      OT Goal Re-Cert Due Date  02/13/21  -        User Key  (r) = Recorded By, (t) = Taken By, (c) = Cosigned By    Initials Name Provider Type    Montez Guillen OT Occupational Therapist        Therapy Charges for Today     Code Description Service Date Service Provider Modifiers Qty    [id number] HC OT EVAL LOW COMPLEXITY 3 2/3/2021 Montez Winn OT GO 1               Montez Winn OT  2/3/2021

## 2021-02-03 NOTE — TELEPHONE ENCOUNTER
Beatriz [phone]    Patient currently experiencing withdrawal from alcohol. Has history of SI, attempted overdose in 2014. Denies any current SI, has access to gun at home. Patient states she experiences SI every once in a while but not all the time. Requesting consult for opinion on condition. Patient does have a sitter currently.     Consult schedule 2/3/21 at 3:00pm

## 2021-02-03 NOTE — PROGRESS NOTES
Continued Stay Note  [facility]     Patient Name: Tierney Mas  MRN: [id number]  Today's Date: 2/3/2021    Admit Date: 2/2/2021    Discharge Plan     Row Name 02/03/21 1531       Plan    Plan Comments  SW was contacted by Hallie nazario [place] reporting that Christine was unable to complete telehealth assessment with pt today as the correct inpatient psychiatrist consult order was never placed and therefore it did not show up on Christine's queue which gives her access to be able to complete the assessment. SW had informed physician about the order being needed to complete the telehealth this AM. SW will follow upw [facility] physician to ensure consult placed and able to be completed. Telehealth rescheduled for tomorrow (2/4) at 2:00pm.    Row Name 02/03/21 1310       Plan    Plan  Home with outpatient mental health resources    Patient/Family in Agreement with Plan  yes    Plan Comments  Spoke with pt at bedside. Pt reports that she lives alone but two of her children come stay with her to visit every other weekend. Pt reports that she is independent in ADL's and IADL's. Pt has prescription coverage with her insurance. Pt does admit to drinking alcohol. There was also an incident, per chart, in the ER yesterday where pt's sister reported pt is suicidal. SW discussed this. Pt explained that in 2014 she did have an atttempted overdose and sought treatment and has not had any attempts since then. Pt reports she has been struggling with anxiety since around that time and had seen a psychiatrist for years. Pt explained that her psychiatrist was great and she was in [place], where pt used to live. Pt reports she moved to [place] and continued telehealth with psychiatrist but hen she retired and she has been unable to find someone. Pt reports she has had suicidal thoughts on and off for several years but not intentions. Pt reports she had thought about using her gun that she has at home but reports that scares her and she doesn't think she  could ever do that. Pt reports that the last time she had any suicidla thoughts was about a week ago. Pt denies any current SI. Telehealth psych assessment has been scheduled for 3:00pm today for second opinion. Pt reports she would like to discharge home and is agreeable to being set up with outpatient psychiatrist.    Final Discharge Disposition Code  01 - home or self-care        Discharge Codes    No documentation.             HARIS Duffy

## 2021-02-03 NOTE — PROGRESS NOTES
[facility] Medicine Services  PROGRESS NOTE    Patient Name: Tierney Mas  : 1965  MRN: [id number]    Date of Admission: 2021  Primary Care Physician: Provider, No Known    Subjective   Subjective     CC:  Etoh withdrawal    HPI:  Still tremulous, asking about her liver function and questions regarding outpatient rehab. Denies suicidal ideation to me    ROS:  Gen- No fevers, chills  CV- No chest pain, palpitations  Resp- No cough, dyspnea  GI- No N/V/D, abd pain      Objective   Objective     Vital Signs:   Temp:  [97.4 °F (36.3 °C)-98.4 °F (36.9 °C)] 98 °F (36.7 °C)  Heart Rate:  [74-96] 85  Resp:  [18] 18  BP: (115-145)/() 132/95        Physical Exam:  Constitutional: mildly anxious, visible tremors  HENT: NCAT, mucous membranes moist  Respiratory: Clear to auscultation bilaterally, respiratory effort normal   Cardiovascular: tachycardic, no murmurs, rubs, or gallops  Gastrointestinal: Positive bowel sounds, soft, nontender, thin  Musculoskeletal: No LE edema  Psychiatric: Appropriate affect, cooperative  Neurologic: Oriented x 3, speech clear  Skin: No rashes    Results Reviewed:  Results from last 7 days   Lab Units 21  0648 21  1635   WBC 10*3/mm3 5.45 3.72   HEMOGLOBIN g/dL 14.1 15.3   HEMATOCRIT % 42.3 45.6   PLATELETS 10*3/mm3 128* 146     Results from last 7 days   Lab Units 21  0648 21  1635   SODIUM mmol/L 141 138   POTASSIUM mmol/L 4.0 4.1   CHLORIDE mmol/L 103 94*   CO2 mmol/L 27.0 29.0   BUN mg/dL 11 9   CREATININE mg/dL 0.75 0.82   GLUCOSE mg/dL 100* 111*   CALCIUM mg/dL 9.3 10.8*   ALT (SGPT) U/L 180* 73*   AST (SGOT) U/L 400* 82*     Estimated Creatinine Clearance: 71.3 mL/min (by C-G formula based on SCr of 0.75 mg/dL).    Microbiology Results Abnormal     Procedure Component Value - Date/Time    COVID PRE-OP / PRE-PROCEDURE SCREENING ORDER (NO ISOLATION) - Swab, Nasopharynx [[id number]]  (Normal) Collected: 21 1648    Lab  Status: Final result Specimen: Swab from Nasopharynx Updated: 02/03/21 0143    Narrative:      The following orders were created for panel order COVID PRE-OP / PRE-PROCEDURE SCREENING ORDER (NO ISOLATION) - Swab, Nasopharynx.  Procedure                               Abnormality         Status                     ---------                               -----------         ------                     COVID-19 and FLU A/B PCR...[[id number]]  Normal              Final result                 Please view results for these tests on the individual orders.    COVID-19 and FLU A/B PCR - Swab, Nasopharynx [[id number]]  (Normal) Collected: 02/02/21 0829    Lab Status: Final result Specimen: Swab from Nasopharynx Updated: 02/03/21 0143     COVID19 Not Detected     Influenza A PCR Not Detected     Influenza B PCR Not Detected    Narrative:      Fact sheet for providers: https://www.fda.gov/media/169660/download    Fact sheet for patients: https://www.fda.gov/media/386605/download    Test performed by PCR.          Imaging Results (Last 24 Hours)     Procedure Component Value Units Date/Time    XR Chest 1 View [[id number]] Collected: 02/03/21 0830     Updated: 02/03/21 0834    Narrative:      EXAMINATION: XR CHEST 1 VW-      INDICATION: anxiety, etoh withdrawal; F10.232-Alcohol dependence with  withdrawal with perceptual disturbance; F10.10-Alcohol abuse,  uncomplicated; R03.0-Elevated blood-pressure reading, without diagnosis  of hypertension; F32.9-Major depressive disorder, single episode,  unspecified; R45.851-Suicidal ideations      COMPARISON: NONE     FINDINGS: No focal airspace opacity. No pleural effusion or  pneumothorax. Normal heart and mediastinal contours.       Impression:      No evidence of acute disease in the chest.      This report was finalized on 2/3/2021 8:31 AM by Jatinder Mccullough.                  I have reviewed the medications:  Scheduled Meds:escitalopram, 10 mg, Oral, Daily  LORazepam, 0.5 mg, Intravenous,  Q6H    Followed by  [START ON 2/4/2021] LORazepam, 0.5 mg, Intravenous, Q8H  nicotine, 1 patch, Transdermal, Q24H  sodium chloride, 10 mL, Intravenous, Q12H      Continuous Infusions:   PRN Meds:.•  acetaminophen **OR** acetaminophen **OR** acetaminophen  •  diazePAM  •  LORazepam **OR** LORazepam **OR** LORazepam **OR** LORazepam **OR** LORazepam **OR** LORazepam  •  ondansetron  •  Sodium Chloride (PF)  •  sodium chloride    Assessment/Plan   Assessment & Plan     Active Hospital Problems    Diagnosis  POA   • **Alcohol withdrawal (CMS/HCC) [F10.239]  Yes   • Depression [F32.9]  Unknown   • Suicidal ideations [R45.851]  Not Applicable   • Smoking [F17.200]  Yes   • Alcohol abuse [F10.10]  Yes   • Alcohol withdrawal with perceptual disturbances (CMS/HCC) [F10.232]  Yes      Resolved Hospital Problems   No resolved problems to display.        Brief Hospital Course to date:  Tierney Mas is a 55 y.o. female with past medical history significant for anxiety/depression, alcohol abuse, prior suicide ideation who presented with alcohol withdrawal.  Patient had been sober and completed voluntary rehab program in 2014 but relapsed about a year ago.  Over the weekend she had relapse and drank a large amount of whiskey with a friend which was her last drink.  She presented today with thoughts of suicide and EtOH withdrawal after binge this weekend.    EtOH withdrawal  Suicidal ideation  -Continue CIWA protocol with p.o. Valium  -Neurochecks and seizure precautions  -Psych consult, continue 72-hour hold started at midnight 2/3  -Chemical dependency following, patient now interested in outpatient rehab. Long discussion with her regarding need for outside help to remain sober especially if she is alone at home  - telepsych visit scheduled for this afternoon      Anxiety/depression  -Continue Lexapro    Tobacco abuse  -Nicotine patch    Greater than 35 min spent with counseling of the patient and consultation with social  work    DVT Prophylaxis: Mechanical      Disposition: I expect the patient to be discharged TBD, after 72 hour hold at least    CODE STATUS:   Code Status and Medical Interventions:   Ordered at: 02/03/21 0447     Level Of Support Discussed With:    Patient     Code Status:    CPR     Medical Interventions (Level of Support Prior to Arrest):    Full       Maria De Jesus White,   02/03/21

## 2021-02-03 NOTE — PLAN OF CARE
Goal Outcome Evaluation:  Plan of Care Reviewed With: patient  Progress: improving  Outcome Summary: PT eval completed.  Patient presents w/ generalized weakness, tremors, impaired balance, gait instability, and functional decline.  Pt. performed STS transfers from chair/toilet w/ RW, CGA and ambulated 250ft w/ RW and CGA.  Pt. would benefit from skilled IPPT to promote return to PLOF.  Recommend home w/ assist at D/C.

## 2021-02-03 NOTE — PROGRESS NOTES
Discharge Planning Assessment  [facility]     Patient Name: Tierney Mas  MRN: [id number]  Today's Date: 2/3/2021    Admit Date: 2/2/2021    Discharge Needs Assessment     Row Name 02/03/21 1309       Living Environment    Lives With  alone    Current Living Arrangements  home/apartment/condo    Primary Care Provided by  self    Provides Primary Care For  no one    Family Caregiver if Needed  none    Quality of Family Relationships  supportive    Able to Return to Prior Arrangements  yes       Resource/Environmental Concerns    Resource/Environmental Concerns  none    Transportation Concerns  car, none       Transition Planning    Patient/Family Anticipates Transition to  home    Patient/Family Anticipated Services at Transition  mental health services;outpatient care    Transportation Anticipated  family or friend will provide       Discharge Needs Assessment    Readmission Within the Last 30 Days  no previous admission in last 30 days    Current Outpatient/Agency/Support Group  outpatient psychiatric care    Equipment Currently Used at Home  none    Concerns to be Addressed  no discharge needs identified    Anticipated Changes Related to Illness  none    Equipment Needed After Discharge  none        Discharge Plan     Row Name 02/03/21 1310       Plan    Plan  Home with outpatient mental health resources    Patient/Family in Agreement with Plan  yes    Plan Comments  Spoke with pt at bedside. Pt reports that she lives alone but two of her children come stay with her to visit every other weekend. Pt reports that she is independent in ADL's and IADL's. Pt has prescription coverage with her insurance. Pt does admit to drinking alcohol. There was also an incident, per chart, in the ER yesterday where pt's sister reported pt is suicidal. SW discussed this. Pt explained that in 2014 she did have an atttempted overdose and sought treatment and has not had any attempts since then. Pt reports she has been struggling  with anxiety since around that time and had seen a psychiatrist for years. Pt explained that her psychiatrist was great and she was in [place], where pt used to live. Pt reports she moved to [place] and continued telehealth with psychiatrist but hen she retired and she has been unable to find someone. Pt reports she has had suicidal thoughts on and off for several years but not intentions. Pt reports she had thought about using her gun that she has at home but reports that scares her and she doesn't think she could ever do that. Pt reports that the last time she had any suicidla thoughts was about a week ago. Pt denies any current SI. Telehealth psych assessment has been scheduled for 3:00pm today for second opinion. Pt reports she would like to discharge home and is agreeable to being set up with outpatient psychiatrist.    Final Discharge Disposition Code  01 - home or self-care        Continued Care and Services - Admitted Since 2/2/2021    Coordination has not been started for this encounter.         Demographic Summary     Row Name 02/03/21 1306       General Information    Reason for Consult  discharge planning        Functional Status     Row Name 02/03/21 1309       Functional Status, IADL    Medications  independent    Meal Preparation  independent    Housekeeping  independent    Laundry  independent    Shopping  independent        Psychosocial    No documentation.       Abuse/Neglect    No documentation.       Legal    No documentation.       Substance Abuse    No documentation.       Patient Forms    No documentation.           HARIS Duffy

## 2021-02-03 NOTE — CONSULTS
"                  Clinical Nutrition     Reason for Visit:   Identified at risk by screening criteria    Patient Name: Tierney Mas  YOB: 1965  MRN: [id number]  Date of Encounter: 02/03/21 15:32 EST  Admission date: 2/2/2021     Comments: Consult received and chart reviewed. RD to follow up as patient available to clarify nutrition risk screen.    Nutrition Assessment   Assessment     Admission diagnosis  EtOH detox/withdrawal    Additional diagnosis/conditions/procedures this admission  Daily EtOH use  Visible tremors  Recent suicidal ideation  Tobacco    Additional PMH/procedures:  Anxiety  Depression  EtOH abuse    Foot surgery (12/2020)    Reported/Observed/Food/Nutrition Related History:      Multiple attempts to see patient, other ancillary staff in patients room. RD to follow up as patient available to clarify nutrition risk screen.      Anthropometrics     Height: 162.6 cm (64\")  Last filed wt: Weight: 53.3 kg (117 lb 8 oz) (02/03/21 0336)  Weight Method: Stated    BMI: BMI (Calculated): 20.2  Normal: 18.5-24.9kg/m2    Ideal Body Weight (IBW) (kg): 55  Admission wt: 117 lb 8 oz  Method obtained: weight     Weight Change   UBW: unsure  Weight change:   % wt change:   Time frame of weight loss:     Labs reviewed     Results from last 7 days   Lab Units 02/03/21  0648 02/02/21  1635   GLUCOSE mg/dL 100* 111*   BUN mg/dL 11 9   CREATININE mg/dL 0.75 0.82   SODIUM mmol/L 141 138   CHLORIDE mmol/L 103 94*   POTASSIUM mmol/L 4.0 4.1   MAGNESIUM mg/dL 1.9  --    ALT (SGPT) U/L 180* 73*     Results from last 7 days   Lab Units 02/03/21  0648 02/02/21  1635   ALBUMIN g/dL 4.00 4.80           No results found for: HGBA1C    Medications reviewed   Pertinent: folic acid, MVI, thiamine 100 mg PO      Intake/Output 24 hrs (7:00AM - 6:59 AM)     Intake & Output (last day)       02/02 0701 - 02/03 0700 02/03 0701 - 02/04 0700    P.O.  1844    Total Intake(mL/kg)  1844 (34.6)    Net  +1844          Urine " Unmeasured Occurrence  3 x        Current Nutrition Prescription     PO: Diet Clear Liquid  Intake: insuff data - 18% x 2 meals    Nutrition Diagnosis     2/3  Problem Inadequate oral intake   Etiology Clinical status   Signs/Symptoms NPO/clear liquid       Nutrition Intervention     1. Follow treatment progress, Care plan reviewed  2. Supplements provided - Boost Breeze x3/day  3. RD to follow up as able to clarify nutrition screen/weight history    Goal:   General: Nutrition support treatment  PO: Advace diet as medically feasible/appropriate      Monitoring/Evaluation:   Per protocol, PO intake, Supplement intake, GI status, Symptoms    Will Continue to follow per protocol    Sisi Reyna RDN, LD  Time Spent: 30 minutes

## 2021-02-03 NOTE — ED PROVIDER NOTES
Subjective   The patient presents to the emergency department secondary to the request for alcohol detox and assistance secondary to withdrawal.  Patient reports her last drink was on Sunday.  Patient is a heavy daily drinker of approximately a 12 pack of white clot.  She reports this weekend she drank some heavy liquors.  She presents complaining of chills, tremors, anxiety, and diaphoresis.  Patient denies any other acute symptoms or complaints at this time.      History provided by:  Patient and relative      Review of Systems   Constitutional: Positive for chills.   Respiratory: Negative.    Cardiovascular: Negative.    Gastrointestinal: Positive for nausea.   Skin: Negative.    Neurological: Positive for tremors.   Psychiatric/Behavioral: Positive for agitation. The patient is nervous/anxious.        Past Medical History:   Diagnosis Date   • Anxiety and depression        No Known Allergies    Past Surgical History:   Procedure Laterality Date   • BUNIONECTOMY     • FOOT FRACTURE SURGERY     • WRIST SURGERY Bilateral        History reviewed. No pertinent family history.    Social History     Socioeconomic History   • Marital status:      Spouse name: Not on file   • Number of children: Not on file   • Years of education: Not on file   • Highest education level: Not on file   Tobacco Use   • Smoking status: Current Every Day Smoker     Packs/day: 1.00     Types: Cigarettes   • Smokeless tobacco: Never Used   Substance and Sexual Activity   • Alcohol use: Yes     Comment: 12 white claws   • Drug use: Never   • Sexual activity: Defer           Objective   Physical Exam  Vitals signs and nursing note reviewed.   Constitutional:       Appearance: Normal appearance. She is diaphoretic. She is not toxic-appearing.      Comments: Patient is anxious and tremulous in appearance.  She appears to be in acute alcohol withdrawal.  She is underweight.   HENT:      Head: Normocephalic and atraumatic.   Neck:       "Musculoskeletal: Normal range of motion.   Cardiovascular:      Rate and Rhythm: Normal rate and regular rhythm.      Pulses: Normal pulses.   Pulmonary:      Effort: Pulmonary effort is normal. No respiratory distress.      Breath sounds: Normal breath sounds.   Abdominal:      General: Abdomen is flat.      Palpations: Abdomen is soft.      Tenderness: There is no abdominal tenderness. There is no guarding.   Musculoskeletal: Normal range of motion.   Skin:     General: Skin is warm.   Neurological:      General: No focal deficit present.      Mental Status: She is alert and oriented to person, place, and time.   Psychiatric:         Mood and Affect: Mood is anxious.         Behavior: Behavior is agitated.         Procedures           ED Course  ED Course as of Feb 03 0052   Tue Feb 02, 2021   1728 Lactate(!!): 4.0 [RS]   1836 Patient evaluated by our addiction coordinator.  Patient is not interested in inpatient rehabilitation services.  However, I have significant concern about her risks associated with withdrawal as she is demonstrating significant symptoms at this time.  We will see if she is willing to stay here in the hospital for least medical detox.    [RS]   1839 Patient is willing to stay.  Her CIWA is at least 17 at this time.  We will plan admission for further evaluation and management.  Hospitalist paged for admission.    [RS]   1849 The sister is now informed our team that the patient has been having some suicidal ideations.  Patient reports she does have a gun at home and \"chickened out\".  We have placed the patient on a hold with suicide precautions and will have behavioral health see the patient.    [RS]   2200 [facility]  Overall her evaluation was not terribly helpful as a not provide any information.  They stated that they do not have any beds available and thus could not give an opinion or thoughts on the patient's case.  My concern is the emergency physician is that " the patient has alcohol withdrawal symptoms as well as significant threat for suicidal ideation and self-harm.  Thus, we will admit the patient here for the alcohol withdrawal on a suicide hold with sitter to be reevaluated once medically cleared.  Hospitalist paged for admission.    [RS]   6408 Case discussed with Dr. Price who will admit.    [RS]      ED Course User Index  [RS] Erlin Ram MD                                           MDM  Number of Diagnoses or Management Options  Alcohol abuse:   Alcohol withdrawal with perceptual disturbances (CMS/HCC):   Depression with suicidal ideation:   Elevated blood pressure reading:   Diagnosis management comments: Recent Results (from the past 24 hour(s))  -Light Blue Top  Collection Time: 02/02/21  4:34 PM       Result                      Value             Ref Range           Extra Tube                                                    hold for add-on  -Comprehensive Metabolic Panel  Collection Time: 02/02/21  4:35 PM  Specimen: Blood       Result                      Value             Ref Range           Glucose                     111 (H)           65 - 99 mg/dL       BUN                         9                 6 - 20 mg/dL        Creatinine                  0.82              0.57 - 1.00 *       Sodium                      138               136 - 145 mm*       Potassium                   4.1               3.5 - 5.2 mm*       Chloride                    94 (L)            98 - 107 mmo*       CO2                         29.0              22.0 - 29.0 *       Calcium                     10.8 (H)          8.6 - 10.5 m*       Total Protein               7.5               6.0 - 8.5 g/*       Albumin                     4.80              3.50 - 5.20 *       ALT (SGPT)                  73 (H)            1 - 33 U/L          AST (SGOT)                  82 (H)            1 - 32 U/L          Alkaline Phosphatase        108               39 - 117 U/L        Total  Bilirubin             0.8               0.0 - 1.2 mg*       eGFR Non  Amer       72                >60 mL/min/1*       Globulin                    2.7               gm/dL               A/G Ratio                   1.8               g/dL                BUN/Creatinine Ratio        11.0              7.0 - 25.0          Anion Gap                   15.0              5.0 - 15.0 m*  -Lipase  Collection Time: 02/02/21  4:35 PM  Specimen: Blood       Result                      Value             Ref Range           Lipase                      67 (H)            13 - 60 U/L    -Lactic Acid, Plasma  Collection Time: 02/02/21  4:35 PM  Specimen: Blood       Result                      Value             Ref Range           Lactate                     4.0 (C)           0.5 - 2.0 mm*  -Ethanol  Collection Time: 02/02/21  4:35 PM  Specimen: Blood       Result                      Value             Ref Range           Ethanol                     <10               0 - 10 mg/dL   -Green Top (Gel)  Collection Time: 02/02/21  4:35 PM       Result                      Value             Ref Range           Extra Tube                                                    Hold for add-ons.  -Lavender Top  Collection Time: 02/02/21  4:35 PM       Result                      Value             Ref Range           Extra Tube                                                    hold for add-on  -Gold Top - SST  Collection Time: 02/02/21  4:35 PM       Result                      Value             Ref Range           Extra Tube                                                    Hold for add-ons.  -Gray Top - Ice  Collection Time: 02/02/21  4:35 PM       Result                      Value             Ref Range           Extra Tube                                                    Hold for add-ons.  -CBC Auto Differential  Collection Time: 02/02/21  4:35 PM  Specimen: Blood       Result                      Value             Ref Range           WBC                          3.72              3.40 - 10.80*       RBC                         5.21              3.77 - 5.28 *       Hemoglobin                  15.3              12.0 - 15.9 *       Hematocrit                  45.6              34.0 - 46.6 %       MCV                         87.5              79.0 - 97.0 *       MCH                         29.4              26.6 - 33.0 *       MCHC                        33.6              31.5 - 35.7 *       RDW                         13.1              12.3 - 15.4 %       RDW-SD                      42.1              37.0 - 54.0 *       MPV                         9.4               6.0 - 12.0 fL       Platelets                   146               140 - 450 10*       Neutrophil %                63.7              42.7 - 76.0 %       Lymphocyte %                23.1              19.6 - 45.3 %       Monocyte %                  12.1 (H)          5.0 - 12.0 %        Eosinophil %                0.3               0.3 - 6.2 %         Basophil %                  0.5               0.0 - 1.5 %         Immature Grans %            0.3               0.0 - 0.5 %         Neutrophils, Absolute       2.37              1.70 - 7.00 *       Lymphocytes, Absolute       0.86              0.70 - 3.10 *       Monocytes, Absolute         0.45              0.10 - 0.90 *       Eosinophils, Absolute       0.01              0.00 - 0.40 *       Basophils, Absolute         0.02              0.00 - 0.20 *       Immature Grans, Absolu*     0.01              0.00 - 0.05 *       nRBC                        0.0               0.0 - 0.2 /1*  -Lactic Acid, Reflex Timer (This will reflex a repeat order 3-3:15 hours after ordered.)  Collection Time: 02/02/21  4:35 PM  Specimen: Blood       Result                      Value             Ref Range           Hold Tube                                                     Hold for add-ons.  -Acetaminophen Level  Collection Time: 02/02/21  4:35 PM  Specimen: Blood        Result                      Value             Ref Range           Acetaminophen               <5.0              0.0 - 30.0 m*  -Salicylate Level  Collection Time: 02/02/21  4:35 PM  Specimen: Blood       Result                      Value             Ref Range           Salicylate                  <0.3              <=30.0 mg/dL   -Urinalysis With Microscopic If Indicated (No Culture) - Urine, Clean Catch  Collection Time: 02/02/21  6:15 PM  Specimen: Urine, Clean Catch       Result                      Value             Ref Range           Color, UA                   Yellow            Yellow, Straw       Appearance, UA              Cloudy (A)        Clear               pH, UA                      8.5 (H)           5.0 - 8.0           Specific [place], UA        1.008             1.001 - 1.030       Glucose, UA                 Negative          Negative            Ketones, UA                 Negative          Negative            Bilirubin, UA               Negative          Negative            Blood, UA                   Negative          Negative            Protein, UA                 Negative          Negative            Leuk Esterase, UA           Trace (A)         Negative            Nitrite, UA                 Negative          Negative            Urobilinogen, UA            1.0 E.U./dL       0.2 - 1.0 E.*  -Urinalysis, Microscopic Only - Urine, Clean Catch  Collection Time: 02/02/21  6:15 PM  Specimen: Urine, Clean Catch       Result                      Value             Ref Range           RBC, UA                     0-2               None Seen, 0*       WBC, UA                     0-2               None Seen, 0*       Bacteria, UA                None Seen         None Seen, T*       Squamous Epithelial Ce*     0-2               None Seen, 0*       Hyaline Casts, UA           None Seen         0 - 6 /LPF          Methodology                                                   Automated Microscopy  -Urine Drug  Screen - Urine, Clean Catch  Collection Time: 02/02/21  6:15 PM  Specimen: Urine, Clean Catch       Result                      Value             Ref Range           THC, Screen, Urine          Negative          Negative            Phencyclidine (PCP), U*     Negative          Negative            Cocaine Screen, Urine       Negative          Negative            Methamphetamine, Ur         Negative          Negative            Opiate Screen               Negative          Negative            Amphetamine Screen, Ur*     Negative          Negative            Benzodiazepine Screen,*     Negative          Negative            Tricyclic Antidepressa*     Negative          Negative            Methadone Screen, Urine     Negative          Negative            Barbiturates Screen, U*     Negative          Negative            Oxycodone Screen, Urine     Negative          Negative            Propoxyphene Screen         Negative          Negative            Buprenorphine, Screen,*     Negative          Negative       -POC Pregnancy, Urine  Collection Time: 02/02/21  6:19 PM  Specimen: Urine       Result                      Value             Ref Range           HCG, Urine, QL              Negative          Negative            Lot Number                  [id number]                            Internal Positive Cont*     Positive                              Internal Negative Cont*     Negative                         -Lactic Acid, Reflex  Collection Time: 02/02/21  8:32 PM  Specimen: Blood       Result                      Value             Ref Range           Lactate                     0.9               0.5 - 2.0 mm*  Note: In addition to lab results from this visit, the labs listed above may include labs taken at another facility or during a different encounter within the last 24 hours. Please correlate lab times with ED admission and discharge times for further clarification of the services performed during this visit.    XR  Chest 1 View    (Results Pending)  -----------------------------------------------------            02/02/21 02/02/21 02/03/21 02/03/21 2034      2255      0024      0030     -----------------------------------------------------   BP:       133/99    124/92    118/85    139/99     BP Location:                                           Patient Position:                                           Pulse:      80        78        94        93       Resp:       18        18        18        18       Temp:                                              SpO2:      99%       97%                 97%       Weight:                                            Height:                                           -----------------------------------------------------  Medications  Sodium Chloride (PF) 0.9 % 10 mL (has no administration in time range)  LORazepam (ATIVAN) injection 0.5 mg (0.5 mg Intravenous Given 2/3/21 0020)    Followed by  LORazepam (ATIVAN) injection 0.5 mg (has no administration in time range)  LORazepam (ATIVAN) tablet 0.5 mg (has no administration in time range)    Or  LORazepam (ATIVAN) injection 0.5 mg (has no administration in time range)    Or  LORazepam (ATIVAN) tablet 1 mg (has no administration in time range)    Or  LORazepam (ATIVAN) injection 1 mg (has no administration in time range)    Or  LORazepam (ATIVAN) injection 1 mg (has no administration in time range)    Or  LORazepam (ATIVAN) injection 1 mg (has no administration in time range)  LORazepam (ATIVAN) injection 1 mg (has no administration in time range)  lactated ringers bolus 1,000 mL (0 mL Intravenous Stopped 2/2/21 1913)  thiamine (B-1) 100 mg, folic acid 1 mg in sodium chloride 0.9 % 1,000 mL infusion (0 mL/hr Intravenous Stopped 2/2/21 1925)  diazePAM (VALIUM) injection 5 mg (5 mg Intravenous Given 2/2/21 1813)  LORazepam (ATIVAN) injection 1 mg (1 mg  Intravenous Given 2/2/21 1924)  LORazepam (ATIVAN) injection 1 mg (1 mg Intravenous Given 2/2/21 2252)  ECG/EMG Results (last 24 hours)     ** No results found for the last 24 hours. **      No orders to display         Amount and/or Complexity of Data Reviewed  Clinical lab tests: reviewed  Decide to obtain previous medical records or to obtain history from someone other than the patient: yes  Obtain history from someone other than the patient: yes  Discuss the patient with other providers: yes    Admit    Final diagnoses:   Alcohol withdrawal with perceptual disturbances (CMS/HCC)   Alcohol abuse   Elevated blood pressure reading   Depression with suicidal ideation            Erlin Ram MD  02/03/21 005

## 2021-02-03 NOTE — THERAPY EVALUATION
Patient Name: Tierney Mas  : 1965    MRN: [id number]                              Today's Date: 2/3/2021       Admit Date: 2021    Visit Dx:     ICD-10-CM ICD-9-CM   1. Alcohol withdrawal with perceptual disturbances (CMS/HCC)  F10.232 291.81   2. Alcohol abuse  F10.10 305.00   3. Elevated blood pressure reading  R03.0 796.2   4. Depression with suicidal ideation  F32.9 311    R45.851 V62.84     Patient Active Problem List   Diagnosis   • Alcohol withdrawal (CMS/HCC)   • Smoking   • Alcohol abuse   • Alcohol withdrawal with perceptual disturbances (CMS/HCC)   • Depression   • Suicidal ideations     Past Medical History:   Diagnosis Date   • Anxiety and depression      Past Surgical History:   Procedure Laterality Date   • BUNIONECTOMY     • FOOT FRACTURE SURGERY     • WRIST SURGERY Bilateral      General Information     Row Name 21          Physical Therapy Time and Intention    Document Type  evaluation  -MB     Mode of Treatment  physical therapy;individual therapy  -MB     Row Name 21          General Information    Patient Profile Reviewed  yes  -MB     Prior Level of Function  independent:;all household mobility;community mobility;gait;transfer;bed mobility;ADL's;home management;driving;using stairs;work  -MB     Existing Precautions/Restrictions  fall tremors  -MB     Barriers to Rehab  ineffective coping  -MB     Row Name 21          Living Environment    Lives With  alone Pt. reports her sister and children can assist her intermittently at D/C.  -MB     Row Name 21          Home Main Entrance    Number of Stairs, Main Entrance  four  -MB     Stair Railings, Main Entrance  railing on left side (ascending)  -MB     Row Name 21          Stairs Within Home, Primary    Number of Stairs, Within Home, Primary  none  -MB     Row Name 21          Cognition    Orientation Status (Cognition)  oriented x 4  -MB     Row Name 21           Safety Issues, Functional Mobility    Safety Issues Affecting Function (Mobility)  insight into deficits/self-awareness;safety precaution awareness;safety precautions follow-through/compliance  -MB     Impairments Affecting Function (Mobility)  balance;coordination;endurance/activity tolerance;strength  -MB       User Key  (r) = Recorded By, (t) = Taken By, (c) = Cosigned By    Initials Name Provider Type    Yanet Akbar, PT Physical Therapist        Mobility     Row Name 02/03/21 0930          Bed Mobility    Comment (Bed Mobility)  Pt. received and left sitting UIC.  -MB     Row Name 02/03/21 0930          Transfers    Comment (Transfers)  STS from chair/commode w/ VCs for safety and sequencing.  Pt. limited by tremors.  -MB     Row Name 02/03/21 0930          Sit-Stand Transfer    Sit-Stand Berger (Transfers)  contact guard;verbal cues  -MB     Assistive Device (Sit-Stand Transfers)  walker, front-wheeled;other (see comments) HHA vs. RW  -MB     Row Name 02/03/21 0930          Gait/Stairs (Locomotion)    Berger Level (Gait)  contact guard;verbal cues  -MB     Assistive Device (Gait)  walker, front-wheeled  -MB     Distance in Feet (Gait)  250  -MB     Deviations/Abnormal Patterns (Gait)  connor decreased;ataxic;base of support, narrow  -MB     Comment (Gait/Stairs)  Pt. ambulated w/ step through, ataxic gait pattern w/ slow connor.  VCs for safe use of RW (to keep close to SHAE).  Pt. demo improved stability w/ gait progression.  -MB       User Key  (r) = Recorded By, (t) = Taken By, (c) = Cosigned By    Initials Name Provider Type    Yanet Akbar, PT Physical Therapist        Obj/Interventions     Row Name 02/03/21 0930          Strength Comprehensive (MMT)    General Manual Muscle Testing (MMT) Assessment  lower extremity strength deficits identified  -MB     Comment, General Manual Muscle Testing (MMT) Assessment  BLEs grossly 4/5  -MB     Row Name 02/03/21 0930           Motor Skills    Motor Skills  coordination  -MB     Row Name 02/03/21 0930          Balance    Balance Assessment  sitting static balance;sitting dynamic balance;standing static balance;standing dynamic balance  -MB     Static Sitting Balance  WFL;unsupported;sitting in chair toilet  -MB     Dynamic Sitting Balance  WFL;sitting in chair  -MB     Static Standing Balance  mild impairment;supported  -MB     Dynamic Standing Balance  mild impairment;supported  -MB     Balance Interventions  standing;sit to stand;occupation based/functional task;weight shifting activity  -MB     Comment, Balance  Pt. stood at sink to wash hands.  Pt. unsteady, w/ mild LOB while turning around.  -MB     Row Name 02/03/21 0930          Sensory Assessment (Somatosensory)    Sensory Assessment (Somatosensory)  LE sensation intact  -MB       User Key  (r) = Recorded By, (t) = Taken By, (c) = Cosigned By    Initials Name Provider Type    Yanet Akbar M, PT Physical Therapist        Goals/Plan     Row Name 02/03/21 0930          Bed Mobility Goal 1 (PT)    Activity/Assistive Device (Bed Mobility Goal 1, PT)  bed mobility activities, all  -MB     [place] Level/Cues Needed (Bed Mobility Goal 1, PT)  independent  -MB     Time Frame (Bed Mobility Goal 1, PT)  1 week  -MB     Progress/Outcomes (Bed Mobility Goal 1, PT)  goal ongoing  -MB     Row Name 02/03/21 0930          Transfer Goal 1 (PT)    Activity/Assistive Device (Transfer Goal 1, PT)  transfers, all  -MB     [place] Level/Cues Needed (Transfer Goal 1, PT)  independent  -MB     Time Frame (Transfer Goal 1, PT)  10 days  -MB     Progress/Outcome (Transfer Goal 1, PT)  goal ongoing  -MB     Row Name 02/03/21 0930          Gait Training Goal 1 (PT)    Activity/Assistive Device (Gait Training Goal 1, PT)  gait (walking locomotion)  -MB     [place] Level (Gait Training Goal 1, PT)  independent  -MB     Distance (Gait Training Goal 1, PT)  500  -MB     Time Frame (Gait  Training Goal 1, PT)  10 days  -MB     Progress/Outcome (Gait Training Goal 1, PT)  goal ongoing  -MB     Row Name 02/03/21 0930          Stairs Goal 1 (PT)    Activity/Assistive Device (Stairs Goal 1, PT)  stairs, all skills;using handrail, left  -MB     [place] Level/Cues Needed (Stairs Goal 1, PT)  modified independence  -MB     Number of Stairs (Stairs Goal 1, PT)  4  -MB     Time Frame (Stairs Goal 1, PT)  by discharge  -MB     Row Name 02/03/21 0930          Patient Education Goal (PT)    Activity (Patient Education Goal, PT)  HEP  -MB     [place]/Cues/Accuracy (Memory Goal 2, PT)  demonstrates adequately;verbalizes understanding  -MB     Time Frame (Patient Education Goal, PT)  1 week  -MB     Progress/Outcome (Patient Education Goal, PT)  goal ongoing  -MB       User Key  (r) = Recorded By, (t) = Taken By, (c) = Cosigned By    Initials Name Provider Type    Yanet Akbar, PT Physical Therapist        Clinical Impression     Row Name 02/03/21 0930          Pain Scale: Numbers Pre/Post-Treatment    Pretreatment Pain Rating  0/10 - no pain  -MB     Posttreatment Pain Rating  0/10 - no pain  -MB     Row Name 02/03/21 0930          Plan of Care Review    Plan of Care Reviewed With  patient  -MB     Progress  improving  -MB     Outcome Summary  PT eval completed.  Patient presents w/ generalized weakness, tremors, impaired balance, gait instability, and functional decline.  Pt. performed STS transfers from chair/toilet w/ RW, CGA and ambulated 250ft w/ RW and CGA.  Pt. would benefit from skilled IPPT to promote return to PLOF.  Recommend home w/ assist at D/C.  -MB     Row Name 02/03/21 0930          Therapy Assessment/Plan (PT)    Patient/Family Therapy Goals Statement (PT)  Return to home, work, PLOF.  -MB     Rehab Potential (PT)  good, to achieve stated therapy goals  -MB     Criteria for Skilled Interventions Met (PT)  yes;meets criteria;skilled treatment is necessary  -MB     Row Name  02/03/21 0930          Vital Signs    Pre Systolic BP Rehab  132  -MB     Pre Treatment Diastolic BP  95  -MB     Intra Systolic BP Rehab  133  -MB     Intra Treatment Diastolic BP  95  -MB     Pre SpO2 (%)  98  -MB     O2 Delivery Pre Treatment  room air  -MB     O2 Delivery Intra Treatment  room air  -MB     Post SpO2 (%)  98  -MB     O2 Delivery Post Treatment  room air  -MB     Pre Patient Position  Sitting  -MB     Intra Patient Position  Standing  -MB     Post Patient Position  Sitting  -MB     Row Name 02/03/21 0930          Positioning and Restraints    Pre-Treatment Position  sitting in chair/recliner  -MB     Post Treatment Position  chair  -MB     In Chair  notified nsg;reclined;call light within reach;encouraged to call for assist;exit alarm on;legs elevated;with other staff w/ sitter  -MB       User Key  (r) = Recorded By, (t) = Taken By, (c) = Cosigned By    Initials Name Provider Type    Yanet Akbar, PT Physical Therapist        Outcome Measures     Row Name 02/03/21 0930          How much help from another person do you currently need...    Turning from your back to your side while in flat bed without using bedrails?  4  -MB     Moving from lying on back to sitting on the side of a flat bed without bedrails?  3  -MB     Moving to and from a bed to a chair (including a wheelchair)?  3  -MB     Standing up from a chair using your arms (e.g., wheelchair, bedside chair)?  3  -MB     Climbing 3-5 steps with a railing?  3  -MB     To walk in hospital room?  3  -MB     AM-PAC 6 Clicks Score (PT)  19  -MB     Row Name 02/03/21 0930          Functional Assessment    Outcome Measure Options  AM-PAC 6 Clicks Basic Mobility (PT)  -MB       User Key  (r) = Recorded By, (t) = Taken By, (c) = Cosigned By    Initials Name Provider Type    Yanet Akbar, PT Physical Therapist        Physical Therapy Education                 Title: PT OT SLP Therapies (In Progress)     Topic: Physical Therapy  (Done)     Point: Mobility training (Done)     Learning Progress Summary           Patient Acceptance, E,D, VU by MB at 2/3/2021 1414                   Point: Home exercise program (Done)     Learning Progress Summary           Patient Acceptance, E,D, VU by MB at 2/3/2021 1414                   Point: Body mechanics (Done)     Learning Progress Summary           Patient Acceptance, E,D, VU by MB at 2/3/2021 1414                   Point: Precautions (Done)     Learning Progress Summary           Patient Acceptance, E,D, VU by MB at 2/3/2021 1414                               User Key     Initials Effective Dates Name Provider Type [facility] 03/14/16 -  Yanet Ayala, PT Physical Therapist PT              PT Recommendation and Plan  Planned Therapy Interventions (PT): balance training, bed mobility training, gait training, home exercise program, patient/family education, stair training, strengthening, transfer training  Plan of Care Reviewed With: patient  Progress: improving  Outcome Summary: PT eval completed.  Patient presents w/ generalized weakness, tremors, impaired balance, gait instability, and functional decline.  Pt. performed STS transfers from chair/toilet w/ RW, CGA and ambulated 250ft w/ RW and CGA.  Pt. would benefit from skilled IPPT to promote return to PLOF.  Recommend home w/ assist at D/C.     Time Calculation:   PT Charges     Row Name 02/03/21 1415             Time Calculation    Start Time  0930  -MB      PT Received On  02/03/21  -MB      PT Goal Re-Cert Due Date  02/13/21  -MB        User Key  (r) = Recorded By, (t) = Taken By, (c) = Cosigned By    Initials Name Provider Type    Yanet Akbar, PT Physical Therapist        Therapy Charges for Today     Code Description Service Date Service Provider Modifiers Qty    [id number]  PT EVAL MOD COMPLEXITY 4 2/3/2021 Yanet Ayala, PT GP 1          PT G-Codes  Outcome Measure Options: AM-PAC 6 Clicks Daily Activity  (OT)  AM-PAC 6 Clicks Score (PT): 19  AM-PAC 6 Clicks Score (OT): 21    Yanet Ayala, PT  2/3/2021

## 2021-02-03 NOTE — PAYOR COMM NOTE
"Anabel Mas (55 y.o. Female)     CARL Bennett , [phone]      Date of Birth Social Security Number Address Home Phone MRN    1965  [id number]  [facility] [address] [phone] [id number]    Mormonism Marital Status          None        Admission Date Admission Type Admitting Provider Attending Provider Department, Room/Bed    21 Emergency Maria De Jesus White DO Carroll, Meghan C,  [facility] 3F, S307/1    Discharge Date Discharge Disposition Discharge Destination                       Attending Provider: Maria De Jesus White DO    Allergies: No Known Allergies    Isolation: None   Infection: None   Code Status: CPR    Ht: 162.6 cm (64\")   Wt: 53.3 kg (117 lb 8 oz)    Admission Cmt: None   Principal Problem: Alcohol withdrawal (CMS/HCC) [F10.239]                 Active Insurance as of 2021     Primary Coverage     Payor Plan Insurance Group Employer/Plan Group    [id number]     Payor Plan Address Payor Plan Phone Number Payor Plan Fax Number Effective Dates    [address]   2003 - None Entered    [place] [address]       Subscriber Name Subscriber Birth Date Member ID       ANABEL MAS 1965 [id number]                 Emergency Contacts      (Rel.) Home Phone Work Phone Mobile Phone    LATANYA LOYA (Sister) [phone] -- [phone]            Emergency Contact Information     Name Relation Home Work Mobile    LATANYA LOYA Sister [phone]             History & Physical      Gabe Price MD at 21 2304              [facility] Medicine Services  HISTORY AND PHYSICAL    Patient Name: Anabel Mas  : 1965  MRN: [id number]  Primary Care Physician: Provider, No Known  Date of admission: 2021    Subjective   Subjective     Chief Complaint:  ETOH withdrawal w/ SI    HPI:  Anabel Mas is a 55 y.o. female with a history of anxiety/depression, " ETOH abuse, prior SI who presents to the ED from home for alcohol withdrawal. Pt states she completed a voluntary rehab in 2014 but never went through withdrawal. She relapsed about a year ago and has been drinking a case of white claws every day, and then this past weekend, a friend came over and they drank an undetermined amount of whiskey. She states they both felt sick afterwards and she has not had a drink since them. She presents to the ED this evening with her sister who came from [place] to help her.  She was seen by the [facility]  in the ED, and offered outpatient resources and she has declined so far.  She also admits to a prior suicide attempt and she has had suicidal thoughts this past weekend while drinking.  She was placed on a 72 hour hold by ER physician.     In the ED, patient has visible tremors and noted to be diaphoretic. She has received ativan 2 mg, valium 5 mg IV, LR 1L, banana bag.   Labs reviewed and significant for lactate 4.0, lipase 67, ALT 73, AST 82, WBC 3.72, UDS neg. COVID screen pending.    Hospital medicine will admit for further evaluation and treatment.        Current COVID Risks are:  [] Fever []  Cough [] Shortness of breath [] Fatigue [] Change in taste or smell    [] Exposure to COVID positive patient  [] High risk facility   [x]  NONE    Review of Systems   Constitutional: Positive for appetite change, chills and diaphoresis.   HENT: Negative.    Eyes: Negative.    Respiratory: Negative.    Cardiovascular: Negative.    Gastrointestinal: Positive for abdominal pain and nausea.   Endocrine: Negative.    Genitourinary: Negative.    Musculoskeletal: Negative.    Skin: Negative.    Allergic/Immunologic: Negative.    Neurological: Positive for tremors and light-headedness.   Hematological: Negative.    Psychiatric/Behavioral: Positive for agitation and suicidal ideas. The patient is nervous/anxious.         All other systems reviewed and are negative.          Personal History     Past Medical History:   Diagnosis Date   • Anxiety and depression        Past Surgical History:   Procedure Laterality Date   • BUNIONECTOMY     • FOOT FRACTURE SURGERY     • WRIST SURGERY Bilateral        Family History: family history is not on file. Otherwise pertinent FHx was reviewed and unremarkable.     Social History:  reports that she has been smoking cigarettes. She has been smoking about 1.00 pack per day. She has never used smokeless tobacco. She reports current alcohol use. She reports that she does not use drugs.  Social History     Social History Narrative   • Not on file       Medications:  escitalopram    No Known Allergies    Objective   Objective     Vital Signs:   Temp:  [97.4 °F (36.3 °C)] 97.4 °F (36.3 °C)  Heart Rate:  [74-96] 93  Resp:  [18] 18  BP: (118-145)/() 139/99    Physical Exam   Constitutional: anxious, alert  Eyes: PERRLA, sclerae anicteric, no conjunctival injection  HENT: NCAT, mucous membranes moist  Neck: Supple, no thyromegaly, no lymphadenopathy, trachea midline  Respiratory: Clear to auscultation bilaterally, nonlabored respirations   Cardiovascular: RRR, no murmurs, rubs, or gallops, palpable pedal pulses bilaterally  Gastrointestinal: Positive bowel sounds, soft, nontender, nondistended  Musculoskeletal: No bilateral ankle edema, no clubbing or cyanosis to extremities  Psychiatric: Appropriate affect, cooperative  Neurologic: Oriented x 3, strength symmetric in all extremities, Cranial Nerves grossly intact to confrontation, speech clear, bilateral upper extremity tremors  Skin: No rashes      Results Reviewed:  I have personally reviewed most recent indicated data and agree with findings including:  [x]  Laboratory  [x]  Radiology  [x]  EKG/Telemetry  []  Pathology  []  Cardiac/Vascular Studies  [x]  Old records  []  Other:  Most pertinent findings include:      LAB RESULTS:      Lab 02/02/21 2032 02/02/21  1635   WBC  --  3.72   HEMOGLOBIN   --  15.3   HEMATOCRIT  --  45.6   PLATELETS  --  146   NEUTROS ABS  --  2.37   IMMATURE GRANS (ABS)  --  0.01   LYMPHS ABS  --  0.86   MONOS ABS  --  0.45   EOS ABS  --  0.01   MCV  --  87.5   LACTATE 0.9 4.0*         Lab 02/02/21  1635   SODIUM 138   POTASSIUM 4.1   CHLORIDE 94*   CO2 29.0   ANION GAP 15.0   BUN 9   CREATININE 0.82   GLUCOSE 111*   CALCIUM 10.8*         Lab 02/02/21  1635   TOTAL PROTEIN 7.5   ALBUMIN 4.80   GLOBULIN 2.7   ALT (SGPT) 73*   AST (SGOT) 82*   BILIRUBIN 0.8   ALK PHOS 108   LIPASE 67*                     Brief Urine Lab Results  (Last result in the past 365 days)      Color   Clarity   Blood   Leuk Est   Nitrite   Protein   CREAT   Urine HCG        02/02/21 1819               Negative         Microbiology Results (last 10 days)     ** No results found for the last 240 hours. **          No radiology results from the last 24 hrs         Assessment/Plan   Assessment & Plan       Alcohol withdrawal (CMS/[facility])    Smoking    Alcohol abuse    Alcohol withdrawal with perceptual disturbances (CMS/[facility])    Depression    Suicidal ideations      1. ETOH withdrawal      Suicidal Ideation  - continue PO valium, CIWA protocol  - neuro checks, seizure precautions  - psych consult, cont 72 hr hold, sitter  - banana bag x 3 days  - SW consult for discharge planning    2. Anxiety/depression  - cont lexapro    3. Tobacco abuse  - nicotine patch    4. Lactic acidosis  - likely due to volume depletion  - continue fluids, reflex pending    DVT prophylaxis:  ambulatory      CODE STATUS:  Full code  There are no questions and answers to display.         This note has been completed as part of a split-shared workflow.     Signature: Electronically signed by QUENTIN Theodore, 02/03/21, 1:07 AM EST      Brief Attending Admission Attestation     I have seen and examined the patient, performing an independent face-to-face diagnostic evaluation with plan of care reviewed and developed with the advanced practice  clinician (APC).    Old records reviewed and summarized in PM hx.    Brief Summary Statement:  55-year-old female, with long term use of alcoholic, last rehab in 2014.  Over past year-patient again started drinking, every day, case of white claws.  Presented to ED today for some help, initial CIWA scale was 16.  Patient declined inpatient rehab, with[facility] consult.  While in ED, patient's family (sister) showed up stating patient had suicidal ideation, also patient has a gun at home.  Patient currently is declining any suicidal thoughts.  Patient has been put on 72 hours hold,  Her last drink was approximately 3 days back.      In ED patient got Ativan 1 mg twice, Ringer lactate, rally pack,  Remainder of detailed HPI is as noted above and has been reviewed and/or edited by me for completeness.    PM HX:  Surgery on the foot-December 2020        Vitals:    02/02/21 2255   BP: 124/92   Pulse: 78   Resp: 18   Temp:  Afebrile.   SpO2: 97%       Attending Physical Exam:  RS- CTA-BL, ,  No wheezing , no crackles, good effort.  CVS- s1s2 regular, tachycardic no murmur.  ABD- soft, non tender, not distended, no organomegaly.  EXT- no edema.  NEURO-confused, tremors in upper extremity      LABS:  Lipase 67 with mildly elevated LFTs, initial lactic acid 4.0-repeat 0.9,  UA-negative  Urine drug screen-negative  Alcohol level less than 10  Covid swab pending-    Brief Assessment/Plan      See above for further detailed assessment and plan developed with APC which I have reviewed and/or edited for completeness.      Admission Status: I believe that this patient meets inpatient status secondary to active alcohol withdrawal.        \    Electronically signed by Gabe Price MD at 02/03/21 1300         Lab Results (last 24 hours)     Procedure Component Value Units Date/Time    Comprehensive Metabolic Panel [[id number]]  (Abnormal) Collected: 02/03/21 0648    Specimen: Blood Updated: 02/03/21 0838     Glucose 100 mg/dL      BUN  11 mg/dL      Creatinine 0.75 mg/dL      Sodium 141 mmol/L      Potassium 4.0 mmol/L      Chloride 103 mmol/L      CO2 27.0 mmol/L      Calcium 9.3 mg/dL      Total Protein 6.1 g/dL      Albumin 4.00 g/dL      ALT (SGPT) 180 U/L      AST (SGOT) 400 U/L      Alkaline Phosphatase 87 U/L      Total Bilirubin 0.9 mg/dL      eGFR Non African Amer 80 mL/min/1.73      Globulin 2.1 gm/dL      A/G Ratio 1.9 g/dL      BUN/Creatinine Ratio 14.7     Anion Gap 11.0 mmol/L     Narrative:      GFR Normal >60  Chronic Kidney Disease <60  Kidney Failure <15      Magnesium [[id number]]  (Normal) Collected: 02/03/21 0648    Specimen: Blood Updated: 02/03/21 0838     Magnesium 1.9 mg/dL     CBC Auto Differential [[id number]]  (Abnormal) Collected: 02/03/21 0648    Specimen: Blood Updated: 02/03/21 0752     WBC 5.45 10*3/mm3      RBC 4.76 10*6/mm3      Hemoglobin 14.1 g/dL      Hematocrit 42.3 %      MCV 88.9 fL      MCH 29.6 pg      MCHC 33.3 g/dL      RDW 13.2 %      RDW-SD 43.6 fl      MPV 10.5 fL      Platelets 128 10*3/mm3      Neutrophil % 67.8 %      Lymphocyte % 24.8 %      Monocyte % 4.2 %      Eosinophil % 2.4 %      Basophil % 0.6 %      Immature Grans % 0.2 %      Neutrophils, Absolute 3.70 10*3/mm3      Lymphocytes, Absolute 1.35 10*3/mm3      Monocytes, Absolute 0.23 10*3/mm3      Eosinophils, Absolute 0.13 10*3/mm3      Basophils, Absolute 0.03 10*3/mm3      Immature Grans, Absolute 0.01 10*3/mm3      nRBC 0.0 /100 WBC     COVID PRE-OP / PRE-PROCEDURE SCREENING ORDER (NO ISOLATION) - Swab, Nasopharynx [[id number]]  (Normal) Collected: 02/02/21 4880    Specimen: Swab from Nasopharynx Updated: 02/03/21 0143    Narrative:      The following orders were created for panel order COVID PRE-OP / PRE-PROCEDURE SCREENING ORDER (NO ISOLATION) - Swab, Nasopharynx.  Procedure                               Abnormality         Status                     ---------                               -----------         ------                        COVID-19 and FLU A/B PCR...[[id number]]  Normal              Final result                 Please view results for these tests on the individual orders.    COVID-19 and FLU A/B PCR - Swab, Nasopharynx [[id number]]  (Normal) Collected: 02/02/21 1728    Specimen: Swab from Nasopharynx Updated: 02/03/21 0143     COVID19 Not Detected     Influenza A PCR Not Detected     Influenza B PCR Not Detected    Narrative:      Fact sheet for providers: https://www.fda.gov/media/160138/download    Fact sheet for patients: https://www.fda.gov/media/223392/download    Test performed by PCR.    Lactic Acid, Reflex [[id number]]  (Normal) Collected: 02/02/21 2032    Specimen: Blood Updated: 02/02/21 2053     Lactate 0.9 mmol/L      Comment: Falsely depressed results may occur on samples drawn from patients receiving N-Acetylcysteine (NAC) or Metamizole.       Lactic Acid, Reflex Timer (This will reflex a repeat order 3-3:15 hours after ordered.) [[id number]] Collected: 02/02/21 1635    Specimen: Blood Updated: 02/02/21 2015     Hold Tube Hold for add-ons.     Comment: Auto resulted.       Acetaminophen Level [[id number]]  (Normal) Collected: 02/02/21 1635    Specimen: Blood Updated: 02/02/21 1925     Acetaminophen <5.0 mcg/mL     Salicylate Level [[id number]]  (Normal) Collected: 02/02/21 1635    Specimen: Blood Updated: 02/02/21 1925     Salicylate <0.3 mg/dL     Urine Drug Screen - Urine, Clean Catch [[id number]]  (Normal) Collected: 02/02/21 1815    Specimen: Urine, Clean Catch Updated: 02/02/21 1906     THC, Screen, Urine Negative     Phencyclidine (PCP), Urine Negative     Cocaine Screen, Urine Negative     Methamphetamine, Ur Negative     Opiate Screen Negative     Amphetamine Screen, Urine Negative     Benzodiazepine Screen, Urine Negative     Tricyclic Antidepressants Screen Negative     Methadone Screen, Urine Negative     Barbiturates Screen, Urine Negative     Oxycodone Screen, Urine Negative     Propoxyphene Screen Negative      Buprenorphine, Screen, Urine Negative    Narrative:      Cutoff For Drugs Screened:    Amphetamines               500 ng/ml  Barbiturates               200 ng/ml  Benzodiazepines            150 ng/ml  Cocaine                    150 ng/ml  Methadone                  200 ng/ml  Opiates                    100 ng/ml  Phencyclidine               25 ng/ml  THC                            50 ng/ml  Methamphetamine            500 ng/ml  Tricyclic Antidepressants  300 ng/ml  Oxycodone                  100 ng/ml  Propoxyphene               300 ng/ml  Buprenorphine               10 ng/ml    The normal value for all drugs tested is negative. This report includes unconfirmed screening results, with the cutoff values listed, to be used for medical treatment purposes only.  Unconfirmed results must not be used for non-medical purposes such as employment or legal testing.  Clinical consideration should be applied to any drug of abuse test, particularly when unconfirmed results are used.      Urinalysis With Microscopic If Indicated (No Culture) - Urine, Clean Catch [[id number]]  (Abnormal) Collected: 02/02/21 1815    Specimen: Urine, Clean Catch Updated: 02/02/21 1833     Color, UA Yellow     Appearance, UA Cloudy     pH, UA 8.5     Specific Gravity, UA 1.008     Glucose, UA Negative     Ketones, UA Negative     Bilirubin, UA Negative     Blood, UA Negative     Protein, UA Negative     Leuk Esterase, UA Trace     Nitrite, UA Negative     Urobilinogen, UA 1.0 E.U./dL    Urinalysis, Microscopic Only - Urine, Clean Catch [[id number]] Collected: 02/02/21 1815    Specimen: Urine, Clean Catch Updated: 02/02/21 1833     RBC, UA 0-2 /HPF      WBC, UA 0-2 /HPF      Bacteria, UA None Seen /HPF      Squamous Epithelial Cells, UA 0-2 /HPF      Hyaline Casts, UA None Seen /LPF      Methodology Automated Microscopy    POC Pregnancy, Urine [[id number]]  (Normal) Collected: 02/02/21 1819    Specimen: Urine Updated: 02/02/21 1820     HCG, Urine, QL  Negative     Lot Number [id number]     Internal Positive Control Positive     Internal Negative Control Negative    [place] Draw [[id number]] Collected: 02/02/21 1634    Specimen: Blood Updated: 02/02/21 1746    Narrative:      The following orders were created for panel order [place] Draw.  Procedure                               Abnormality         Status                     ---------                               -----------         ------                     Light Blue Top[[id number]]                                   Final result               Green Top (Gel)[[id number]]                                  Final result               Lavender Top[[id number]]                                     Final result               Gold Top - SST[[id number]]                                   Final result               Gray Top - Ice[[id number]]                                   Final result                 Please view results for these tests on the individual orders.    Light Blue Top [[id number]] Collected: 02/02/21 1634    Specimen: Blood Updated: 02/02/21 1746     Extra Tube hold for add-on     Comment: Auto resulted       Green Top (Gel) [[id number]] Collected: 02/02/21 1635    Specimen: Blood Updated: 02/02/21 1746     Extra Tube Hold for add-ons.     Comment: Auto resulted.       Lavender Top [[id number]] Collected: 02/02/21 1635    Specimen: Blood Updated: 02/02/21 1746     Extra Tube hold for add-on     Comment: Auto resulted       Gold Top - SST [[id number]] Collected: 02/02/21 1635    Specimen: Blood Updated: 02/02/21 1746     Extra Tube Hold for add-ons.     Comment: Auto resulted.       Gray Top - Ice [[id number]] Collected: 02/02/21 1635    Specimen: Blood Updated: 02/02/21 1746     Extra Tube Hold for add-ons.     Comment: Auto resulted.       Lactic Acid, Plasma [[id number]]  (Abnormal) Collected: 02/02/21 1635    Specimen: Blood Updated: 02/02/21 1714     Lactate 4.0 mmol/L      Comment: Falsely depressed results may occur  on samples drawn from patients receiving N-Acetylcysteine (NAC) or Metamizole.       Comprehensive Metabolic Panel [[id number]]  (Abnormal) Collected: 02/02/21 1635    Specimen: Blood Updated: 02/02/21 1710     Glucose 111 mg/dL      BUN 9 mg/dL      Creatinine 0.82 mg/dL      Sodium 138 mmol/L      Potassium 4.1 mmol/L      Chloride 94 mmol/L      CO2 29.0 mmol/L      Calcium 10.8 mg/dL      Total Protein 7.5 g/dL      Albumin 4.80 g/dL      ALT (SGPT) 73 U/L      AST (SGOT) 82 U/L      Alkaline Phosphatase 108 U/L      Total Bilirubin 0.8 mg/dL      eGFR Non African Amer 72 mL/min/1.73      Globulin 2.7 gm/dL      A/G Ratio 1.8 g/dL      BUN/Creatinine Ratio 11.0     Anion Gap 15.0 mmol/L     Narrative:      GFR Normal >60  Chronic Kidney Disease <60  Kidney Failure <15      Lipase [[id number]]  (Abnormal) Collected: 02/02/21 1635    Specimen: Blood Updated: 02/02/21 1710     Lipase 67 U/L     Ethanol [[id number]]  (Normal) Collected: 02/02/21 1635    Specimen: Blood Updated: 02/02/21 1710     Ethanol <10 mg/dL     CBC & Differential [[id number]]  (Abnormal) Collected: 02/02/21 1635    Specimen: Blood Updated: 02/02/21 1650    Narrative:      The following orders were created for panel order CBC & Differential.  Procedure                               Abnormality         Status                     ---------                               -----------         ------                     CBC Auto Differential[[id number]]        Abnormal            Final result                 Please view results for these tests on the individual orders.    CBC Auto Differential [[id number]]  (Abnormal) Collected: 02/02/21 1635    Specimen: Blood Updated: 02/02/21 1650     WBC 3.72 10*3/mm3      RBC 5.21 10*6/mm3      Hemoglobin 15.3 g/dL      Hematocrit 45.6 %      MCV 87.5 fL      MCH 29.4 pg      MCHC 33.6 g/dL      RDW 13.1 %      RDW-SD 42.1 fl      MPV 9.4 fL      Platelets 146 10*3/mm3      Neutrophil % 63.7 %      Lymphocyte % 23.1 %       Monocyte % 12.1 %      Eosinophil % 0.3 %      Basophil % 0.5 %      Immature Grans % 0.3 %      Neutrophils, Absolute 2.37 10*3/mm3      Lymphocytes, Absolute 0.86 10*3/mm3      Monocytes, Absolute 0.45 10*3/mm3      Eosinophils, Absolute 0.01 10*3/mm3      Basophils, Absolute 0.02 10*3/mm3      Immature Grans, Absolute 0.01 10*3/mm3      nRBC 0.0 /100 WBC         Imaging Results (Last 24 Hours)     Procedure Component Value Units Date/Time    XR Chest 1 View [[id number]] Collected: 02/03/21 0830     Updated: 02/03/21 0834    Narrative:      EXAMINATION: XR CHEST 1 VW-      INDICATION: anxiety, etoh withdrawal; F10.232-Alcohol dependence with  withdrawal with perceptual disturbance; F10.10-Alcohol abuse,  uncomplicated; R03.0-Elevated blood-pressure reading, without diagnosis  of hypertension; F32.9-Major depressive disorder, single episode,  unspecified; R45.851-Suicidal ideations      COMPARISON: NONE     FINDINGS: No focal airspace opacity. No pleural effusion or  pneumothorax. Normal heart and mediastinal contours.       Impression:      No evidence of acute disease in the chest.      This report was finalized on 2/3/2021 8:31 AM by Jatinder Mccullough.           Physician Progress Notes (last 24 hours) (Notes from 02/02/21 1015 through 02/03/21 1015)    No notes of this type exist for this encounter.         Consult Notes (last 24 hours) (Notes from 02/02/21 1015 through 02/03/21 1015)    No notes of this type exist for this encounter.

## 2021-02-03 NOTE — CONSULTS
This provider is located at the [facility] (through [facility]), [address], [address] using a secure video visit through [employer]. The patient is being seen remotely via telehealth at [facility], [address], [place] [address]. The patient's condition being consulted/diagnosed/treated is appropriate for telemedicine. The provider identified herself as well as her credentials.   The patient, and/or patients guardian, consent to be seen remotely, and when consent is given they understand that the consent allows for patient identifiable information to be sent to a third party as needed.   They may refuse to be seen remotely at any time. The electronic data is encrypted and password protected, and the patient and/or guardian has been advised of the potential risks to privacy not withstanding such measures.     The use of a video visit has been reviewed with the patient and verbal informed consent has been obtained.    Referring Provider: Maria De Jesus White DO    Reason for Consultation: Suicidal ideation    Chief complaint/Focus of Exam:     Subjective    Accompanied by: The patient's nurse is present at the time of the encounter due to the patient's condition and to assist with facilitation of the visit.  The patient gives verbal consent for the nurse to be present during the encounter.    History of present illness:   Tierney Mas is a 55-year-old female with a past medical history significant for anxiety, depression, alcohol abuse, and prior suicidal ideations who presented to the [facility] emergency department for alcohol withdrawal and requesting assistance with alcohol detox.  A psychiatric consult was ordered due to reported suicidal ideations.  At the time of today's encounter the patient is alert and oriented to person, place, time, and situation.  The patient is anxious and tremulous throughout the encounter.   "The patient reports her alcohol withdrawal symptoms are improving.  The patient denies any auditory or visual hallucinations.  The patient complains of tremors, sweats, and difficulty walking due to her tremors.  The patient reports a long history of alcohol abuse.  The patient states she was treated once at an inpatient AUD rehab in [facility] in 2014.  The patient states the facility used and \"AA-based program\" that did not work for her.  The patient states after that program she started \"sneaking drinks\" again which escalated her alcohol use.  The patient states she has received counseling for her alcohol abuse and anxiety in the past when she lived in [place].  The patient states she has had a lot of life stressors recently.  The patient states she has recently went through a bad divorce and has been through custody battles with her ex-.  The patient states she had to \"wear an ankle monitor for a year\".  The patient reports she had to wear an ankle monitor due to her history of alcohol abuse, she denies any other reason.  The patient states she moved to [place] in 2016 to go to [employer].  The patient states her anxiety has been worsening, she is still battling with her ex over custody of her 16 and 18-year-old children, she is in a civil suit with her neighbor, and she recently had surgery in December on her foot that has forced her to stay home more than usual.  The patient reports all of these life stressors have increased her anxiety which has led her to increase in her alcohol use.  The patient states this past weekend she went on a \"zacarias\" with a friend, and when she woke up her withdrawals were so bad she had to go to the emergency department.  The patient states she has been offered inpatient AUD services, but refuses.  The patient states she has animals at home that have to be cared for.  The patient states she feels an inpatient rehab stay would be worse for her mental health as she " would be worried about her pets and animals.  The patient states she has nobody to help her take care of her home and animals.  The patient states she currently is taking Lexapro 10 mg by mouth once daily for anxiety and depression that was prescribed by her OB/GYN.  The patient states she has also taken Celexa and Wellbutrin in the past, but Lexapro seemed to help the most.  The patient states she has had suicidal ideations off and on over the years, but they returned over the last week.  The patient reports passive suicidal ideations including being better off not here.  The patient adamantly denies any active suicidal or homicidal ideations, plans, or intent at the time of this encounter.  The patient states she would never harm herself because of her kids.  The patient reports her kids as her protective factor.  The patient states she also has a good support system including her older son who lives in [place], her sister, a neighbor, her boss, and some friends.  This APRN has discussed treatment options that are available for the patient and she is agreeable to an increase in her Lexapro dosage to better cover her depressive and anxious symptoms, but refuses inpatient AUD rehab at this time.  The patient is able to verbalize in her own words the benefits of inpatient rehab and risks of refusing inpatient rehab.  The patient states she is agreeable to outpatient rehab services, counseling, and follow-up with a psychiatric mental health care provider.      History    Past Medical History:   Diagnosis Date   • Anxiety and depression      Past Surgical History:   Procedure Laterality Date   • BUNIONECTOMY     • FOOT FRACTURE SURGERY     • WRIST SURGERY Bilateral      History reviewed. No pertinent family history.  Social History     Socioeconomic History   • Marital status:      Spouse name: Not on file   • Number of children: Not on file   • Years of education: Not on file   • Highest education level: Not  on file   Tobacco Use   • Smoking status: Current Every Day Smoker     Packs/day: 1.00     Types: Cigarettes   • Smokeless tobacco: Never Used   Substance and Sexual Activity   • Alcohol use: Yes     Comment: 12 white claws   • Drug use: Never   • Sexual activity: Defer         Problem List:  Patient Active Problem List   Diagnosis   • Alcohol withdrawal (CMS/HCC)   • Smoking   • Alcohol abuse   • Alcohol withdrawal with perceptual disturbances (CMS/HCC)   • Depression   • Suicidal ideations       Allergy:   No Known Allergies     Current Medications:   Current Facility-Administered Medications   Medication Dose Route Frequency Provider Last Rate Last Admin   • acetaminophen (TYLENOL) tablet 650 mg  650 mg Oral Q4H PRN Vivi Baldwin PA        Or   • acetaminophen (TYLENOL) 160 MG/5ML solution 650 mg  650 mg Oral Q4H PRN Vivi Baldwin PA        Or   • acetaminophen (TYLENOL) suppository 650 mg  650 mg Rectal Q4H PRN Vivi Baldwin PA       • diazePAM (VALIUM) tablet 2 mg  2 mg Oral Q8H PRN Gabe Price MD       • escitalopram (LEXAPRO) tablet 10 mg  10 mg Oral Daily Vivi Baldwin PA   10 mg at 02/03/21 0922   • folic acid (FOLVITE) tablet 1 mg  1 mg Oral Daily Maria De Jesus White, DO   1 mg at 02/03/21 1155   • LORazepam (ATIVAN) tablet 0.5 mg  0.5 mg Oral Q2H PRN Gabe Price MD   0.5 mg at 02/03/21 1611    Or   • LORazepam (ATIVAN) tablet 1 mg  1 mg Oral Q1H PRN Gabe Price MD       • multivitamin with minerals 1 tablet  1 tablet Oral Daily Maria De Jesus White DO   1 tablet at 02/03/21 1156   • nicotine (NICODERM CQ) 21 MG/24HR patch 1 patch  1 patch Transdermal Q24H Gabe Price MD   1 patch at 02/03/21 0354   • ondansetron (ZOFRAN) injection 4 mg  4 mg Intravenous Q6H PRN Vivi Baldwin PA       • Sodium Chloride (PF) 0.9 % 10 mL  10 mL Intravenous PRN Erlin Ram MD       • sodium chloride 0.9 % flush 10 mL  10 mL Intravenous Q12H Vivi Baldwin PA   10 mL at 02/03/21 0923   "  • sodium chloride 0.9 % flush 10 mL  10 mL Intravenous PRN Vivi Baldwin PA       • thiamine (VITAMIN B-1) tablet 100 mg  100 mg Oral Daily WhiteMaria De Jesus beckman ZEN    100 mg at 02/03/21 1155         Review of Systems  All systems were reviewed and negative except for:  Neurological: positive for  difficulty walking and tremors  Behavioral/Psych: positive for  anxiety and depression      Objective     Physical Exam:   Blood pressure 119/93, pulse 114, temperature 98.8 °F (37.1 °C), temperature source Oral, resp. rate 18, height 162.6 cm (64\"), weight 53.3 kg (117 lb 8 oz), SpO2 97 %. Body mass index is 20.17 kg/m².     Vitals:    02/03/21 0344 02/03/21 0648 02/03/21 1131 02/03/21 1512   BP: 131/96 132/95 133/92 119/93   BP Location: Left arm Left arm Right arm Left arm   Patient Position: Lying Lying Sitting Lying   Pulse: 79 85 105 114   Resp: 18 18 18 18   Temp: 98.4 °F (36.9 °C) 98 °F (36.7 °C) 98 °F (36.7 °C) 98.8 °F (37.1 °C)   TempSrc: Oral Oral Oral Oral   SpO2: 97% 97% 97% 97%   Weight:       Height:              Mental Status Exam:  Hygiene:   fair  Cooperation:  Cooperative  Eye Contact:  Good  Psychomotor Behavior:  Restless and tremulous  Mood: Anxious  Affect:  Appropriate  Hopelessness: Denies  Speech:  Normal  Thought Progress:  Goal directed  Thought Content:  Mood congruent  Suicidal:  None  Homicidal:  None  Hallucinations:  None  Delusion:  None  Memory:  Intact  Orientation:  Person, Place, Time and Situation  Reliability:  fair  Insight:  Poor  Judgement:  Poor and Impaired  Impulse Control:  Poor and Impaired        Results Review:  Lab Results (last 24 hours)     Procedure Component Value Units Date/Time    Comprehensive Metabolic Panel [[id number]]  (Abnormal) Collected: 02/03/21 0648    Specimen: Blood Updated: 02/03/21 0838     Glucose 100 mg/dL      BUN 11 mg/dL      Creatinine 0.75 mg/dL      Sodium 141 mmol/L      Potassium 4.0 mmol/L      Chloride 103 mmol/L      CO2 27.0 mmol/L      " Calcium 9.3 mg/dL      Total Protein 6.1 g/dL      Albumin 4.00 g/dL      ALT (SGPT) 180 U/L      AST (SGOT) 400 U/L      Alkaline Phosphatase 87 U/L      Total Bilirubin 0.9 mg/dL      eGFR Non African Amer 80 mL/min/1.73      Globulin 2.1 gm/dL      A/G Ratio 1.9 g/dL      BUN/Creatinine Ratio 14.7     Anion Gap 11.0 mmol/L     Narrative:      GFR Normal >60  Chronic Kidney Disease <60  Kidney Failure <15      Magnesium [[id number]]  (Normal) Collected: 02/03/21 0648    Specimen: Blood Updated: 02/03/21 0838     Magnesium 1.9 mg/dL     CBC Auto Differential [[id number]]  (Abnormal) Collected: 02/03/21 0648    Specimen: Blood Updated: 02/03/21 0752     WBC 5.45 10*3/mm3      RBC 4.76 10*6/mm3      Hemoglobin 14.1 g/dL      Hematocrit 42.3 %      MCV 88.9 fL      MCH 29.6 pg      MCHC 33.3 g/dL      RDW 13.2 %      RDW-SD 43.6 fl      MPV 10.5 fL      Platelets 128 10*3/mm3      Neutrophil % 67.8 %      Lymphocyte % 24.8 %      Monocyte % 4.2 %      Eosinophil % 2.4 %      Basophil % 0.6 %      Immature Grans % 0.2 %      Neutrophils, Absolute 3.70 10*3/mm3      Lymphocytes, Absolute 1.35 10*3/mm3      Monocytes, Absolute 0.23 10*3/mm3      Eosinophils, Absolute 0.13 10*3/mm3      Basophils, Absolute 0.03 10*3/mm3      Immature Grans, Absolute 0.01 10*3/mm3      nRBC 0.0 /100 WBC     COVID PRE-OP / PRE-PROCEDURE SCREENING ORDER (NO ISOLATION) - Swab, Nasopharynx [[id number]]  (Normal) Collected: 02/02/21 8561    Specimen: Swab from Nasopharynx Updated: 02/03/21 0143    Narrative:      The following orders were created for panel order COVID PRE-OP / PRE-PROCEDURE SCREENING ORDER (NO ISOLATION) - Swab, Nasopharynx.  Procedure                               Abnormality         Status                     ---------                               -----------         ------                     COVID-19 and FLU A/B PCR...[[id number]]  Normal              Final result                 Please view results for these tests on the  individual orders.    COVID-19 and FLU A/B PCR - Swab, Nasopharynx [[id number]]  (Normal) Collected: 02/02/21 2358    Specimen: Swab from Nasopharynx Updated: 02/03/21 0143     COVID19 Not Detected     Influenza A PCR Not Detected     Influenza B PCR Not Detected    Narrative:      Fact sheet for providers: https://www.fda.gov/media/754045/download    Fact sheet for patients: https://www.fda.gov/media/467301/download    Test performed by PCR.    Lactic Acid, Reflex [[id number]]  (Normal) Collected: 02/02/21 2032    Specimen: Blood Updated: 02/02/21 2053     Lactate 0.9 mmol/L      Comment: Falsely depressed results may occur on samples drawn from patients receiving N-Acetylcysteine (NAC) or Metamizole.       Lactic Acid, Reflex Timer (This will reflex a repeat order 3-3:15 hours after ordered.) [[id number]] Collected: 02/02/21 1635    Specimen: Blood Updated: 02/02/21 2015     Hold Tube Hold for add-ons.     Comment: Auto resulted.       Acetaminophen Level [[id number]]  (Normal) Collected: 02/02/21 1635    Specimen: Blood Updated: 02/02/21 1925     Acetaminophen <5.0 mcg/mL     Salicylate Level [[id number]]  (Normal) Collected: 02/02/21 1635    Specimen: Blood Updated: 02/02/21 1925     Salicylate <0.3 mg/dL     Urine Drug Screen - Urine, Clean Catch [[id number]]  (Normal) Collected: 02/02/21 1815    Specimen: Urine, Clean Catch Updated: 02/02/21 1906     THC, Screen, Urine Negative     Phencyclidine (PCP), Urine Negative     Cocaine Screen, Urine Negative     Methamphetamine, Ur Negative     Opiate Screen Negative     Amphetamine Screen, Urine Negative     Benzodiazepine Screen, Urine Negative     Tricyclic Antidepressants Screen Negative     Methadone Screen, Urine Negative     Barbiturates Screen, Urine Negative     Oxycodone Screen, Urine Negative     Propoxyphene Screen Negative     Buprenorphine, Screen, Urine Negative    Narrative:      Cutoff For Drugs Screened:    Amphetamines               500  ng/ml  Barbiturates               200 ng/ml  Benzodiazepines            150 ng/ml  Cocaine                    150 ng/ml  Methadone                  200 ng/ml  Opiates                    100 ng/ml  Phencyclidine               25 ng/ml  THC                            50 ng/ml  Methamphetamine            500 ng/ml  Tricyclic Antidepressants  300 ng/ml  Oxycodone                  100 ng/ml  Propoxyphene               300 ng/ml  Buprenorphine               10 ng/ml    The normal value for all drugs tested is negative. This report includes unconfirmed screening results, with the cutoff values listed, to be used for medical treatment purposes only.  Unconfirmed results must not be used for non-medical purposes such as employment or legal testing.  Clinical consideration should be applied to any drug of abuse test, particularly when unconfirmed results are used.          Imaging Results (Last 24 Hours)     Procedure Component Value Units Date/Time    XR Chest 1 View [[id number]] Collected: 02/03/21 0830     Updated: 02/03/21 0834    Narrative:      EXAMINATION: XR CHEST 1 VW-      INDICATION: anxiety, etoh withdrawal; F10.232-Alcohol dependence with  withdrawal with perceptual disturbance; F10.10-Alcohol abuse,  uncomplicated; R03.0-Elevated blood-pressure reading, without diagnosis  of hypertension; F32.9-Major depressive disorder, single episode,  unspecified; R45.851-Suicidal ideations      COMPARISON: NONE     FINDINGS: No focal airspace opacity. No pleural effusion or  pneumothorax. Normal heart and mediastinal contours.       Impression:      No evidence of acute disease in the chest.      This report was finalized on 2/3/2021 8:31 AM by Jatinder Mccullough.               Assessment/Plan   Problems Addressed this Visit        Other    Alcohol abuse    Relevant Medications    escitalopram (LEXAPRO) 10 MG tablet    Alcohol withdrawal with perceptual disturbances (CMS/HCC) - Primary    Relevant Medications    escitalopram  (LEXAPRO) 10 MG tablet      Other Visit Diagnoses     Elevated blood pressure reading        Depression with suicidal ideation        Relevant Medications    escitalopram (LEXAPRO) 10 MG tablet      Diagnoses       Codes Comments    Alcohol withdrawal with perceptual disturbances (CMS/HCC)    -  Primary ICD-10-CM: F10.232  ICD-9-CM: 291.81     Alcohol abuse     ICD-10-CM: F10.10  ICD-9-CM: 305.00     Elevated blood pressure reading     ICD-10-CM: R03.0  ICD-9-CM: 796.2     Depression with suicidal ideation     ICD-10-CM: F32.9, R45.851  ICD-9-CM: 311, V62.84           Visit Diagnoses:    ICD-10-CM ICD-9-CM   1. Alcohol withdrawal with perceptual disturbances (CMS/HCC)  F10.232 291.81   2. Alcohol abuse  F10.10 305.00   3. Elevated blood pressure reading  R03.0 796.2   4. Depression with suicidal ideation  F32.9 311    R45.851 V62.84       Principal Problem:    Alcohol withdrawal (CMS/HCC)  Active Problems:    Smoking    Alcohol abuse    Alcohol withdrawal with perceptual disturbances (CMS/HCC)    Depression    Suicidal ideations             -Alcohol withdrawal  -Anxiety  -Depression  -History of passive suicidal ideations      Recommendations:  -At the time of today's encounter the patient is alert and oriented to person, place, time, and situation.  The patient remains anxious and tremulous throughout the encounter.  The patient denies any auditory or visual hallucinations.  The patient reports passive suicidal ideations over the past week including thoughts of being better off not here.  The patient adamantly denies any active suicidal or homicidal ideations, plans, or intent at the time of this encounter.  The patient reports her children as her protective factor against suicide.  This APRN has recommended inpatient AUD treatment for the patient and she refuses at today's encounter.  This APRN has discussed with the patient if she refuses inpatient AUD treatment then outpatient AUD treatment is recommended with  follow-up with a psychiatric mental health care provider closely upon discharge after medically stabilized.  The patient verbally agrees to this.  Recommend increase in the patient's Lexapro to 20 mg by mouth once daily for better coverage of her depressive and anxious symptoms, the patient is in verbal agreement with this.  If suicidal ideations returned then an inpatient psychiatric evaluation is recommended.  -The patient's condition could change at any time, please re-consult as needed.        I discussed the patients findings and my recommendations with patient, nursing staff and primary care team      Thank you for this consultation and allowing us to assist in the care of your patient.  If you have any further questions or concerns, please contact me at [phone].               DELIA Prather  02/03/21  18:52 EST      Please note that portions of this note were completed with a voice recognition program. Efforts were made to edit dictation, but occasionally words are mistranscribed.

## 2021-02-03 NOTE — PLAN OF CARE
Problem: Adult Inpatient Plan of Care  Goal: Plan of Care Review  Recent Flowsheet Documentation  Taken 2/3/2021 1049 by Montez Winn OT  Progress: no change  Plan of Care Reviewed With: patient  Outcome Summary: Initial OT evaluation completed. Pt presents w/ impaired balance, decreased activity tolerance, new onset tremors, and impaired balance warranting skilled OT services to promote return to PLOF and ADL independence. Pt was SBA for bed mobility, CGA and hand-held assist for STS/bed-chair transfers, and Ind/setup for seated grooming tasks. Once symptoms resolve, Pt will likely be safe to discharge to home - however will follow chem dependecy team's recommendations.

## 2021-02-04 PROCEDURE — 97530 THERAPEUTIC ACTIVITIES: CPT

## 2021-02-04 PROCEDURE — 99232 SBSQ HOSP IP/OBS MODERATE 35: CPT | Performed by: NURSE PRACTITIONER

## 2021-02-04 RX ORDER — ESCITALOPRAM OXALATE 20 MG/1
20 TABLET ORAL DAILY
Status: DISCONTINUED | OUTPATIENT
Start: 2021-02-04 | End: 2021-02-06 | Stop reason: HOSPADM

## 2021-02-04 RX ADMIN — LORAZEPAM 1 MG: 1 TABLET ORAL at 18:40

## 2021-02-04 RX ADMIN — LORAZEPAM 1 MG: 1 TABLET ORAL at 23:08

## 2021-02-04 RX ADMIN — FOLIC ACID 1 MG: 1 TABLET ORAL at 08:35

## 2021-02-04 RX ADMIN — LORAZEPAM 0.5 MG: 0.5 TABLET ORAL at 03:44

## 2021-02-04 RX ADMIN — THIAMINE HCL TAB 100 MG 100 MG: 100 TAB at 08:35

## 2021-02-04 RX ADMIN — SODIUM CHLORIDE, PRESERVATIVE FREE 10 ML: 5 INJECTION INTRAVENOUS at 08:38

## 2021-02-04 RX ADMIN — Medication 1 PATCH: at 05:03

## 2021-02-04 RX ADMIN — SODIUM CHLORIDE, PRESERVATIVE FREE 10 ML: 5 INJECTION INTRAVENOUS at 20:19

## 2021-02-04 RX ADMIN — MULTIPLE VITAMINS W/ MINERALS TAB 1 TABLET: TAB at 08:35

## 2021-02-04 RX ADMIN — ESCITALOPRAM OXALATE 20 MG: 20 TABLET ORAL at 08:38

## 2021-02-04 RX ADMIN — LORAZEPAM 0.5 MG: 0.5 TABLET ORAL at 08:38

## 2021-02-04 RX ADMIN — LORAZEPAM 1 MG: 1 TABLET ORAL at 11:25

## 2021-02-04 NOTE — PROGRESS NOTES
Continued Stay Note   [facility]     Patient Name: Tierney Mas  MRN: [id number]  Today's Date: 2/4/2021    Admit Date: 2/2/2021    Discharge Plan     Row Name 02/04/21 1714       Plan    Plan  Ongoing    Plan Comments  Please refer to my partner's note, the patient has resources to pursue for AUD and Psych Services.    Row Name 02/04/21 1530       Plan    Plan  Home with Resources    Plan Comments  PT        Discharge Codes    No documentation.             Mónica Delcid RN ,BSN   Addiction Coordinator

## 2021-02-04 NOTE — PROGRESS NOTES
Continued Stay Note  [facility]     Patient Name: Tierney Mas  MRN: [id number]  Today's Date: 2/3/2021    Admit Date: 2/2/2021    Discharge Plan     Row Name 02/03/21 1933       Plan    Plan  Ongoing    Plan Comments  Reviewed the PMHNP's note from today.  We will visit with the patient tomorrow and offer her AUD outpatient options as well as some psychiatric services.        Discharge Codes    No documentation.             Mónica Delcid RN ,BSN   Addiction Coordinator

## 2021-02-04 NOTE — THERAPY TREATMENT NOTE
Patient Name: Tierney Mas  : 1965    MRN: [id number]                              Today's Date: 2021       Admit Date: 2021    Visit Dx:     ICD-10-CM ICD-9-CM   1. Alcohol withdrawal with perceptual disturbances (CMS/HCC)  F10.232 291.81   2. Alcohol abuse  F10.10 305.00   3. Elevated blood pressure reading  R03.0 796.2   4. Depression with suicidal ideation  F32.9 311    R45.851 V62.84     Patient Active Problem List   Diagnosis   • Alcohol withdrawal (CMS/HCC)   • Smoking   • Alcohol abuse   • Alcohol withdrawal with perceptual disturbances (CMS/HCC)   • Depression   • Suicidal ideations     Past Medical History:   Diagnosis Date   • Anxiety and depression      Past Surgical History:   Procedure Laterality Date   • BUNIONECTOMY     • FOOT FRACTURE SURGERY     • WRIST SURGERY Bilateral      General Information     Row Name 21 1325          OT Time and Intention    Document Type  therapy note (daily note)  -AN     Mode of Treatment  occupational therapy  -AN     Row Name 21 1325          General Information    Existing Precautions/Restrictions  fall;other (see comments) UE tremors  -AN     Row Name 21 1325          Cognition    Orientation Status (Cognition)  oriented x 3  -AN     Row Name 21 1325          Safety Issues, Functional Mobility    Impairments Affecting Function (Mobility)  balance;coordination;endurance/activity tolerance;strength  -AN       User Key  (r) = Recorded By, (t) = Taken By, (c) = Cosigned By    Initials Name Provider Type    AN Yokasta Ge, OT Occupational Therapist          Mobility/ADL's     Row Name 21 1326          Bed Mobility    Supine-Sit [place] (Bed Mobility)  modified independence  -AN     Assistive Device (Bed Mobility)  head of bed elevated  -AN     Row Name 21 1326          Transfers    Transfers  toilet transfer  -AN     Comment (Transfers)  pt impulsive  -AN     Bed-Chair [place] (Transfers)  supervision   -AN     Sit-Stand [place] (Transfers)  supervision  -AN     [place] Level (Toilet Transfer)  supervision  -AN     Assistive Device (Toilet Transfer)  raised toilet seat;grab bars/safety frame  -AN     Row Name 02/04/21 1326          Toilet Transfer    Type (Toilet Transfer)  sit-stand;stand-sit  -AN     Row Name 02/04/21 1326          Functional Mobility    Functional Mobility- Ind. Level  contact guard assist  -AN     Functional Mobility-Distance (Feet)  20  -AN     Row Name 02/04/21 1326          Activities of Daily Living    BADL Assessment/Intervention  grooming;feeding;toileting  -AN     Row Name 02/04/21 1326          Grooming Assessment/Training    [place] Level (Grooming)  wash face, hands;supervision  -AN     Position (Grooming)  sink side;unsupported standing  -AN     Row Name 02/04/21 1326          Self-Feeding Assessment/Training    [place] Level (Feeding)  liquids to mouth;prepare tray/open items;standby assist  -AN     Comment (Feeding)  reviewed weighted utensils for feeding due to moderate hand tremors; pt declined stating she hopes it continues to improve  -AN     Row Name 02/04/21 1326          Toileting Assessment/Training    [place] Level (Toileting)  perform perineal hygiene;standby assist  -AN       User Key  (r) = Recorded By, (t) = Taken By, (c) = Cosigned By    Initials Name Provider Type    AN Yokasta Ge OT Occupational Therapist        Obj/Interventions     Row Name 02/04/21 1328          Shoulder (Therapeutic Exercise)    Shoulder (Therapeutic Exercise)  AROM (active range of motion)  -AN     Shoulder AROM (Therapeutic Exercise)  bilateral;horizontal aBduction/aDduction;extension;flexion;10 repetitions;2 sets  -AN     Row Name 02/04/21 1328          Elbow/Forearm (Therapeutic Exercise)    Elbow/Forearm (Therapeutic Exercise)  AROM (active range of motion)  -AN     Elbow/Forearm AROM (Therapeutic Exercise)  bilateral;flexion;extension;supination;pronation;10  repetitions;2 sets  -AN     Row Name 02/04/21 1328          Balance    Balance Assessment  sitting static balance;sitting dynamic balance;standing static balance;standing dynamic balance  -AN     Static Sitting Balance  WFL  -AN     Dynamic Sitting Balance  WFL  -AN     Dynamic Standing Balance  mild impairment  -AN     Row Name 02/04/21 1328          Therapeutic Exercise    Therapeutic Exercise  shoulder;elbow/forearm  -AN       User Key  (r) = Recorded By, (t) = Taken By, (c) = Cosigned By    Initials Name Provider Type    AN Yokasta Ge OT Occupational Therapist        Goals/Plan    No documentation.       Clinical Impression     Row Name 02/04/21 1357          Pain Scale: Numbers Pre/Post-Treatment    Pretreatment Pain Rating  0/10 - no pain  -AN     Posttreatment Pain Rating  0/10 - no pain  -AN     Row Name 02/04/21 1357          Plan of Care Review    Plan of Care Reviewed With  patient  -AN     Outcome Summary  OT tx, pt supine to sit with mod I, HOB elevated. Pt stood with SBA, impulsive, not following safety cues. Pt CGA for mobility in room, to bathroom, supv toilet txfr. Pt completed grooming sinkside with supv, mobility to chair with CGA.  Discussed use of AE for feeding to temper her tremors, but pt declined. Completed 2 sets of ROM ther ex, limited with increased HR. Continue POC, pt working with CM team on discharge.  -AN     Row Name 02/04/21 1354          Therapy Assessment/Plan (OT)    Patient/Family Therapy Goal Statement (OT)  agreeable to therapy  -AN     Row Name 02/04/21 1354          Vital Signs    Pretreatment Heart Rate (beats/min)  109  -AN     Intratreatment Heart Rate (beats/min)  133  -AN     Posttreatment Heart Rate (beats/min)  125  -AN     Row Name 02/04/21 1357          Positioning and Restraints    Pre-Treatment Position  in bed  -AN     Post Treatment Position  chair  -AN     In Chair  reclined;call light within reach;encouraged to call for assist;exit alarm on  -AN        User Key  (r) = Recorded By, (t) = Taken By, (c) = Cosigned By    Initials Name Provider Type    Yokasta Vang OT Occupational Therapist        Outcome Measures     Row Name 02/04/21 1405          How much help from another is currently needed...    Putting on and taking off regular lower body clothing?  3  -AN     Bathing (including washing, rinsing, and drying)  3  -AN     Toileting (which includes using toilet bed pan or urinal)  4  -AN     Putting on and taking off regular upper body clothing  4  -AN     Taking care of personal grooming (such as brushing teeth)  4  -AN     Eating meals  4  -AN     AM-PAC 6 Clicks Score (OT)  22  -AN       User Key  (r) = Recorded By, (t) = Taken By, (c) = Cosigned By    Initials Name Provider Type    Yokasta Vang OT Occupational Therapist        Occupational Therapy Education                 Title: PT OT SLP Therapies (In Progress)     Topic: Occupational Therapy (In Progress)     Point: ADL training (Done)     Description:   Instruct learner(s) on proper safety adaptation and remediation techniques during self care or transfers.   Instruct in proper use of assistive devices.              Learning Progress Summary           Patient Acceptance, E, VU,NR by AN at 2/4/2021 1312    Comment: ADL retraining.                   Point: Home exercise program (Not Started)     Description:   Instruct learner(s) on appropriate technique for monitoring, assisting and/or progressing therapeutic exercises/activities.              Learner Progress:  Not documented in this visit.          Point: Precautions (Done)     Description:   Instruct learner(s) on prescribed precautions during self-care and functional transfers.              Learning Progress Summary           Patient Acceptance, E, VU by BERTA at 2/3/2021 1056    Comment: Reviewed safety precautions/awareness in room                   Point: Body mechanics (Done)     Description:   Instruct learner(s) on proper positioning and  spine alignment during self-care, functional mobility activities and/or exercises.              Learning Progress Summary           Patient Acceptance, E, VU by CS at 2/3/2021 1056    Comment: Reviewed safety precautions/awareness in room                               User Key     Initials Effective Dates Name Provider Type Discipline    AN 06/22/15 -  Yokasta Ge, VASYL Occupational Therapist OT    BERTA 10/21/20 -  Montez Winn OT Occupational Therapist OT              OT Recommendation and Plan     Plan of Care Review  Plan of Care Reviewed With: patient  Outcome Summary: OT tx, pt supine to sit with mod I, HOB elevated. Pt stood with SBA, impulsive, not following safety cues. Pt CGA for mobility in room, to bathroom, supv toilet txfr. Pt completed grooming sinkside with supv, mobility to chair with CGA.  Discussed use of AE for feeding to temper her tremors, but pt declined. Completed 2 sets of ROM ther ex, limited with increased HR. Continue POC, pt working with CM team on discharge.     Time Calculation:   Time Calculation- OT     Row Name 02/04/21 1407             Time Calculation- OT    OT Start Time  1312  -AN      Total Timed Code Minutes- OT  13 minute(s)  -AN      OT Received On  02/04/21  -AN      OT Goal Re-Cert Due Date  02/13/21  -AN        User Key  (r) = Recorded By, (t) = Taken By, (c) = Cosigned By    Initials Name Provider Type    AN Yokasta Ge OT Occupational Therapist        Therapy Charges for Today     Code Description Service Date Service Provider Modifiers Qty    [id number]  OT THERAPEUTIC ACT EA 15 MIN 2/4/2021 Yokasta Ge OT GO 1               Yokasta Ge OT  2/4/2021

## 2021-02-04 NOTE — PROGRESS NOTES
Continued Stay Note  [facility]     Patient Name: Tierney Mas  MRN: [id number]  Today's Date: 2/4/2021    Admit Date: 2/2/2021    Discharge Plan     Row Name 02/04/21 0844       Plan    Plan Comments  DEL notified the telehealth was able to be completed after order placed yesterday. SW will set up outpatient psychiatrist appointment for pt as long as she remains agreeable.        Discharge Codes    No documentation.             HARIS Duffy

## 2021-02-04 NOTE — PROGRESS NOTES
[facility]  PROGRESS NOTE    Patient Name: Tierney Mas  : 1965  MRN: [id number]    Date of Admission: 2021  Primary Care Physician: Provider, No Known    Subjective   Subjective     CC:  ETOH withdrawal     HPI:  Patient is sitting up in bed on phone. She is still tremulous and anxious. Denies suicidal ideation or intent to me and Charge nurse. Will dc suicide precautions. She denies hallucinations. No acute events overnight per nursing.     ROS:  Gen- No fevers, chills  CV- No chest pain, palpitations  Resp- No cough, dyspnea  GI- No N/V/D, abd pain        Objective   Objective     Vital Signs:   Temp:  [98 °F (36.7 °C)-98.9 °F (37.2 °C)] 98.9 °F (37.2 °C)  Heart Rate:  [] 101  Resp:  [18] 18  BP: (119-142)/(92-98) 142/97        Physical Exam:  Constitutional: No acute distress, awake, alert  HENT: NCAT, mucous membranes moist  Respiratory: Clear to auscultation bilaterally, respiratory effort normal room air   Cardiovascular: RRR, no murmurs, rubs, or gallops  Gastrointestinal: Positive bowel sounds, soft, nontender, nondistended  Musculoskeletal: No bilateral ankle edema  Psychiatric: Appropriate affect, cooperative, anxious   Neurologic: Oriented x 3, strength symmetric in all extremities, Cranial Nerves grossly intact to confrontation, speech clear, visible tremors   Skin: No rashes      Results Reviewed:  Results from last 7 days   Lab Units 21  0648 21  1635   WBC 10*3/mm3 5.45 3.72   HEMOGLOBIN g/dL 14.1 15.3   HEMATOCRIT % 42.3 45.6   PLATELETS 10*3/mm3 128* 146     Results from last 7 days   Lab Units 21  0648 21  1635   SODIUM mmol/L 141 138   POTASSIUM mmol/L 4.0 4.1   CHLORIDE mmol/L 103 94*   CO2 mmol/L 27.0 29.0   BUN mg/dL 11 9   CREATININE mg/dL 0.75 0.82   GLUCOSE mg/dL 100* 111*   CALCIUM mg/dL 9.3 10.8*   ALT (SGPT) U/L 180* 73*   AST (SGOT) U/L 400* 82*     Estimated Creatinine Clearance: 71.3 mL/min (by  C-G formula based on SCr of 0.75 mg/dL).    Microbiology Results Abnormal     Procedure Component Value - Date/Time    COVID PRE-OP / PRE-PROCEDURE SCREENING ORDER (NO ISOLATION) - Swab, Nasopharynx [[id number]]  (Normal) Collected: 02/02/21 2358    Lab Status: Final result Specimen: Swab from Nasopharynx Updated: 02/03/21 0143    Narrative:      The following orders were created for panel order COVID PRE-OP / PRE-PROCEDURE SCREENING ORDER (NO ISOLATION) - Swab, Nasopharynx.  Procedure                               Abnormality         Status                     ---------                               -----------         ------                     COVID-19 and FLU A/B PCR...[[id number]]  Normal              Final result                 Please view results for these tests on the individual orders.    COVID-19 and FLU A/B PCR - Swab, Nasopharynx [[id number]]  (Normal) Collected: 02/02/21 2358    Lab Status: Final result Specimen: Swab from Nasopharynx Updated: 02/03/21 0143     COVID19 Not Detected     Influenza A PCR Not Detected     Influenza B PCR Not Detected    Narrative:      Fact sheet for providers: https://www.fda.gov/media/030103/download    Fact sheet for patients: https://www.fda.gov/media/115697/download    Test performed by PCR.          Imaging Results (Last 24 Hours)     ** No results found for the last 24 hours. **               I have reviewed the medications:  Scheduled Meds:escitalopram, 20 mg, Oral, Daily  folic acid, 1 mg, Oral, Daily  multivitamin with minerals, 1 tablet, Oral, Daily  nicotine, 1 patch, Transdermal, Q24H  sodium chloride, 10 mL, Intravenous, Q12H  thiamine, 100 mg, Oral, Daily      Continuous Infusions:   PRN Meds:.•  acetaminophen **OR** acetaminophen **OR** acetaminophen  •  diazePAM  •  LORazepam **OR** [DISCONTINUED] LORazepam **OR** LORazepam **OR** [DISCONTINUED] LORazepam **OR** [DISCONTINUED] LORazepam **OR** [DISCONTINUED] LORazepam  •  ondansetron  •  Sodium Chloride  (PF)  •  sodium chloride    Assessment/Plan   Assessment & Plan     Active Hospital Problems    Diagnosis  POA   • **Alcohol withdrawal (CMS/HCC) [F10.239]  Yes   • Depression [F32.9]  Unknown   • Suicidal ideations [R45.851]  Not Applicable   • Smoking [F17.200]  Yes   • Alcohol abuse [F10.10]  Yes   • Alcohol withdrawal with perceptual disturbances (CMS/HCC) [F10.232]  Yes      Resolved Hospital Problems   No resolved problems to display.        Brief Hospital Course to date:  Tierney Mas is a 55 y.o. female with past medical history significant for anxiety/depression, alcohol abuse, prior suicide ideation who presented with alcohol withdrawal.  Patient had been sober and completed voluntary rehab program in 2014 but relapsed about a year ago.  Over the weekend she had relapse and drank a large amount of whiskey with a friend which was her last drink.  She presented today with thoughts of suicide and EtOH withdrawal after binge this weekend.    Plan was partially entered by my partner and I have reviewed and updated as appropriate on 2/4/21     EtOH withdrawal  Suicidal ideation  -Continue CIWA protocol with prn ativan   -Neurochecks and seizure precautions  -Psych consult, continue 72-hour hold started at midnight 2/3  -Chemical dependency following, patient now interested in outpatient rehab. Long discussion with her regarding need for outside help to remain sober especially if she is alone at home  - telepsych visit 2/3 they recommended inpatient therapy but pt declines.   -- increased lexapro to 20 mg  -- suicide precautions also dc'd 2/4 pt denies any active suicidal thought, intent or plan        Anxiety/depression  -Continue Lexapro  -- Increase to 20 mg per psychiatry rec's      Tobacco abuse  -Nicotine patch       DVT Prophylaxis:  [facility]       Disposition: I expect the patient to be discharged 1-2 days if symptoms improve     CODE STATUS:   Code Status and Medical Interventions:   Ordered at: 02/03/21  0447     Level Of Support Discussed With:    Patient     Code Status:    CPR     Medical Interventions (Level of Support Prior to Arrest):    Full       Hallie Jaime, APRN  02/04/21

## 2021-02-04 NOTE — PLAN OF CARE
Goal Outcome Evaluation:        Outcome Summary: VSS, RA, A/Ox4. Patients CIWA ranged from 11-2. Pt tremored all shift. Patient most at ease when sister, who is in from [place], was in the room. Patient relayed to this RN her fears of going home and moving forward. After her sister left, her CIWA went from a 3 to a 9. PRN ativan given with protocol. Patient denies pain. Complains of severe anxiety. Suicide sitter D/C'd due to patient not being suicidal. No other complaints at this time.

## 2021-02-04 NOTE — PLAN OF CARE
Goal Outcome Evaluation:  Plan of Care Reviewed With: patient     Outcome Summary: OT tx, pt supine to sit with mod I, HOB elevated. Pt stood with SBA, impulsive, not following safety cues. Pt CGA for mobility in room, to bathroom, supv toilet txfr. Pt completed grooming sinkside with supv, mobility to chair with CGA.  Discussed use of AE for feeding to temper her tremors, but pt declined. Completed 2 sets of ROM ther ex, limited with increased HR. Continue POC, pt working with CM team on discharge.

## 2021-02-04 NOTE — PLAN OF CARE
Goal Outcome Evaluation:   VSS on RA. A&O. SR to ST on monitor. Standby assist. CIWA as high as 12, PRN ativan given per orders, at time of this note CIWA 10. Sitter at bedside. Side rails padded. Denies any needs at this time.

## 2021-02-04 NOTE — PROGRESS NOTES
Continued Stay Note  [facility]     Patient Name: Tierney Mas  MRN: [id number]  Today's Date: 2/4/2021    Admit Date: 2/2/2021    Discharge Plan     Row Name 02/04/21 0990       Plan    Plan Home with Resources    Plan Comments Spoke with patient at bedside. She was resting in the chair. She said that she was definitely feeling better then she was the other night when she came in. She still has visible tremors. I asked her about outpatient AUD treatment. She stated that she would like to first be assessed for her mental health issues and have her medications reviewed and adjusted, if necessary. I advised her of [facility] and the services they provide, which include AUD and mental health services. She said that at this time she is not interested in traveling to [place] from [place] to receive treatment. I was able to find a Mental Health Clinic, [facility], in [place], which had also been suggested by CM/SW. She believes that if she gets her mental health issues controlled she may not want to drink alcohol as much. She stated that her main reason for drinking was stress. While we were talking her sister arrived. I briefly explained to her the services offered, including [facility], which the patient had already signed up for. The patient has my contact information if she needs any Outreach Services. We will continue to follow and offer recovery support and services.        Discharge Codes    No documentation.             Raiza Villalobos     Chemical Dependency Team  Ext. [phone]

## 2021-02-05 ENCOUNTER — TELEPHONE (OUTPATIENT)
Dept: FAMILY MEDICINE CLINIC | Facility: CLINIC | Age: 56
End: 2021-02-05

## 2021-02-05 PROCEDURE — 97116 GAIT TRAINING THERAPY: CPT

## 2021-02-05 PROCEDURE — 99232 SBSQ HOSP IP/OBS MODERATE 35: CPT | Performed by: NURSE PRACTITIONER

## 2021-02-05 RX ORDER — HYDROXYZINE HYDROCHLORIDE 25 MG/1
25 TABLET, FILM COATED ORAL EVERY 8 HOURS
Status: DISCONTINUED | OUTPATIENT
Start: 2021-02-05 | End: 2021-02-06 | Stop reason: HOSPADM

## 2021-02-05 RX ADMIN — DIAZEPAM 2 MG: 2 TABLET ORAL at 18:01

## 2021-02-05 RX ADMIN — LORAZEPAM 1 MG: 1 TABLET ORAL at 19:24

## 2021-02-05 RX ADMIN — THIAMINE HCL TAB 100 MG 100 MG: 100 TAB at 10:45

## 2021-02-05 RX ADMIN — HYDROXYZINE HYDROCHLORIDE 25 MG: 25 TABLET, FILM COATED ORAL at 12:02

## 2021-02-05 RX ADMIN — LORAZEPAM 1 MG: 1 TABLET ORAL at 12:02

## 2021-02-05 RX ADMIN — SODIUM CHLORIDE, PRESERVATIVE FREE 10 ML: 5 INJECTION INTRAVENOUS at 10:46

## 2021-02-05 RX ADMIN — LORAZEPAM 1 MG: 1 TABLET ORAL at 18:02

## 2021-02-05 RX ADMIN — LORAZEPAM 1 MG: 1 TABLET ORAL at 15:18

## 2021-02-05 RX ADMIN — LORAZEPAM 1 MG: 1 TABLET ORAL at 02:58

## 2021-02-05 RX ADMIN — LORAZEPAM 0.5 MG: 0.5 TABLET ORAL at 05:28

## 2021-02-05 RX ADMIN — FOLIC ACID 1 MG: 1 TABLET ORAL at 10:46

## 2021-02-05 RX ADMIN — MULTIPLE VITAMINS W/ MINERALS TAB 1 TABLET: TAB at 10:45

## 2021-02-05 RX ADMIN — HYDROXYZINE HYDROCHLORIDE 25 MG: 25 TABLET, FILM COATED ORAL at 19:24

## 2021-02-05 RX ADMIN — ESCITALOPRAM OXALATE 20 MG: 20 TABLET ORAL at 10:45

## 2021-02-05 RX ADMIN — Medication 1 PATCH: at 05:28

## 2021-02-05 RX ADMIN — DIAZEPAM 2 MG: 2 TABLET ORAL at 10:45

## 2021-02-05 NOTE — PROGRESS NOTES
"                  Clinical Nutrition     Reason for Visit:   Follow-up protocol    Patient Name: Tierney Mas  YOB: 1965  MRN: [id number]  Date of Encounter: 02/05/21 08:21 EST  Admission date: 2/2/2021     Nutrition Assessment   Assessment     Admission diagnosis  EtOH detox/withdrawal    Additional diagnosis/conditions/procedures this admission  Daily EtOH use  Visible tremors  Recent suicidal ideation  Tobacco    Additional PMH/procedures:  Anxiety  Depression  EtOH abuse    Foot surgery (12/2020)    Reported/Observed/Food/Nutrition Related History:      Patient reports her appetite is not great. She is eating what she can but is not really hungry. Reports she had decreased appetite d/t presenting symptoms. Outside of that denies any prolonged decreased oral intake or significant weight loss. Reports a UBW of ~113 - 114 lbs. She feels she would do better with liquids at the moment. Discussed starting oral nutrition supplements and ordering soups, puddings, etc with catering staff as she feels she can tolerate.      Anthropometrics     Height: 162.6 cm (64\")  Last filed wt: Weight: 53.3 kg (117 lb 8 oz) (02/03/21 0336)  Weight Method: Stated    BMI: BMI (Calculated): 20.2  Normal: 18.5-24.9kg/m2    Ideal Body Weight (IBW) (kg): 55  Admission wt: 117 lb 8 oz  Method obtained: weight     Weight Change   UBW: 113 - 114 lbs per patient report  Weight change:   % wt change:   Time frame of weight loss:     Labs reviewed     Results from last 7 days   Lab Units 02/03/21  0648 02/02/21  1635   GLUCOSE mg/dL 100* 111*   BUN mg/dL 11 9   CREATININE mg/dL 0.75 0.82   SODIUM mmol/L 141 138   CHLORIDE mmol/L 103 94*   POTASSIUM mmol/L 4.0 4.1   MAGNESIUM mg/dL 1.9  --    ALT (SGPT) U/L 180* 73*     Results from last 7 days   Lab Units 02/03/21  0648 02/02/21  1635   ALBUMIN g/dL 4.00 4.80           No results found for: HGBA1C    Medications reviewed   Pertinent: folic acid, MVI, thiamine 100 mg " PO      Intake/Output 24 hrs (7:00AM - 6:59 AM)     Intake & Output (last day)       02/04 0701 - 02/05 0700 02/05 0701 - 02/06 0700    P.O. 1770     Total Intake(mL/kg) 1770 (33.2)     Net +1770           Urine Unmeasured Occurrence 2 x         Current Nutrition Prescription     PO: Diet Regular  Intake: 80% x 5 meals    Nutrition Diagnosis     2/3 updated 2/5  Problem Inadequate oral intake   Etiology Clinical status   Signs/Symptoms NPO/clear liquid   Status: diet advanced to regular, patient consuming 80% of past 5 meals    Nutrition Intervention     1. Follow treatment progress, Care plan reviewed  2. Advised alternate selections  3. Supplements adjusted- Vanilla Ensure HP x2/day  4. Encouraged oral /supplemental intake    Goal:   General: Nutrition support treatment  PO: Continue positive trend      Monitoring/Evaluation:   Per protocol, PO intake, Supplement intake, GI status, Symptoms    Will Continue to follow per protocol    Sisi Reyna RDN, LD  Time Spent: 30 minutes

## 2021-02-05 NOTE — PLAN OF CARE
Goal Outcome Evaluation:  Plan of Care Reviewed With: (P) patient  Progress: (P) improving  Outcome Summary: (P) Pt demonstrates BUE and BLE tremor with activity. Pt requires min A with tandem walking. Pt ambulated 400' with CGA. Pt complains of anxiety.  NSG is allowing pt up at S[place] in room.

## 2021-02-05 NOTE — THERAPY TREATMENT NOTE
Patient Name: Tierney Mas  : 1965    MRN: [id number]                              Today's Date: 2021       Admit Date: 2021    Visit Dx:     ICD-10-CM ICD-9-CM   1. Alcohol withdrawal with perceptual disturbances (CMS/HCC)  F10.232 291.81   2. Alcohol abuse  F10.10 305.00   3. Elevated blood pressure reading  R03.0 796.2   4. Depression with suicidal ideation  F32.9 311    R45.851 V62.84     Patient Active Problem List   Diagnosis   • Alcohol withdrawal (CMS/HCC)   • Smoking   • Alcohol abuse   • Alcohol withdrawal with perceptual disturbances (CMS/HCC)   • Depression   • Suicidal ideations     Past Medical History:   Diagnosis Date   • Anxiety and depression      Past Surgical History:   Procedure Laterality Date   • BUNIONECTOMY     • FOOT FRACTURE SURGERY     • WRIST SURGERY Bilateral      General Information     Row Name 21 1617          Physical Therapy Time and Intention    Document Type  therapy note (daily note)  (Pended)   -RN     Mode of Treatment  individual therapy;physical therapy  (Pended)   -RN     Row Name 21 [facility]          General Information    Patient Profile Reviewed  yes  (Pended)   -RN     Prior Level of Function  --  (Pended)   -RN     Existing Precautions/Restrictions  no known precautions/restrictions  (Pended)   -RN     Barriers to Rehab  none identified  (Pended)   -RN     Row Name 21 161          Living Environment    Lives With  --  (Pended)   -RN     Row Name 21 161          Cognition    Orientation Status (Cognition)  oriented to;person  (Pended)   -RN     Row Name 21 1617          Safety Issues, Functional Mobility    Impairments Affecting Function (Mobility)  balance;coordination;other (see comments)  (Pended)  BLE and BUE tremors  -RN     Comment, Safety Issues/Impairments (Mobility)  LOB x1 with tandem walking requiring min A for steadying.  (Pended)   -RN       User Key  (r) = Recorded By, (t) = Taken By, (c) = Cosigned By     Initials Name Provider Type    RN Lupe Hernandez, PT Student PT Student        Mobility     Row Name 02/05/21 1627          Bed Mobility    Bed Mobility  sit-supine  (Pended)   -RN     Rolling Left [place] (Bed Mobility)  --  (Pended)   -RN     Rolling Right [place] (Bed Mobility)  --  (Pended)   -RN     Scooting/Bridging [place] (Bed Mobility)  --  (Pended)   -RN     Supine-Sit [place] (Bed Mobility)  --  (Pended)   -RN     Sit-Supine [place] (Bed Mobility)  independent  (Pended)   -RN     Supine-Sit-Supine [place] (Bed Mobility)  --  (Pended)   -RN     Row Name 02/05/21 1627          Transfers    Comment (Transfers)  Pt exhibits slight sway with standing static balance when donning gait belt but no LOB noted.  (Pended)   -RN     Row Name 02/05/21 1627          Bed-Chair Transfer    Bed-Chair [place] (Transfers)  independent  (Pended)   -RN     Assistive Device (Bed-Chair Transfers)  other (see comments)  (Pended)  no AD  -RN     Row Name 02/05/21 1627          Sit-Stand Transfer    Sit-Stand [place] (Transfers)  independent  (Pended)   -RN     Assistive Device (Sit-Stand Transfers)  other (see comments)  (Pended)  no AD  -RN     Row Name 02/05/21 1627          Gait/Stairs (Locomotion)    [place] Level (Gait)  contact guard  (Pended)   -RN     Assistive Device (Gait)  other (see comments)  (Pended)  no AD  -RN     Distance in Feet (Gait)  400'  (Pended)   -RN     Deviations/Abnormal Patterns (Gait)  base of support, narrow;other (see comments);ataxic  (Pended)  decreased arm swing and trunk rotation, tremors in BLE with active movement.  -RN     [place] Level (Stairs)  not tested  (Pended)   -RN     Comment (Gait/Stairs)  360 degree turn, backwards walking, side stepping, and tandem walking. All gait tasks were CGA except for tandem walking which was min A due to unsteady gait and LOB x 1.  (Pended)   -RN       User Key  (r) = Recorded By, (t) = Taken By, (c) =  Cosigned By    Initials Name Provider Type    Lupe Leon, PT Student PT Student        Obj/Interventions     [facility] Name 02/05/21 1633          Range of Motion Comprehensive    General Range of Motion  --  (Pended)   -RN     [facility] Name 02/05/21 1633          Strength Comprehensive (MMT)    General Manual Muscle Testing (MMT) Assessment  --  (Pended)   -RN     [facility] Name 02/05/21 1633          Motor Skills    Motor Skills  coordination  (Pended)   -RN     Coordination  minimal impairment  (Pended)   -RN     Row Name 02/05/21 1633          Balance    Balance Assessment  standing static balance;standing dynamic balance  (Pended)   -RN     Static Sitting Balance  mild impairment  (Pended)   -RN     Dynamic Sitting Balance  mild impairment  (Pended)   -RN     Static Standing Balance  mild impairment  (Pended)   -RN     Dynamic Standing Balance  mild impairment  (Pended)   -RN     Balance Interventions  standing;motor strategy training;narrowed base of support;dynamic;static;minimal challenge;tandem gait;other (see comments)  (Pended)  360 degree turns, side stepping, backwards walking  -RN     Row Name 02/05/21 1633          Lower Extremity (Manual Muscle Testing)    Lower Extremity: Manual Muscle Testing (MMT)  left LE strength is WFL;right LE strength is WFL  (Pended)   -RN       User Key  (r) = Recorded By, (t) = Taken By, (c) = Cosigned By    Initials Name Provider Type    Lupe Leon, PT Student PT Student        Goals/Plan     Row Name 02/05/21 1651          Bed Mobility Goal 1 (PT)    Activity/Assistive Device (Bed Mobility Goal 1, PT)  bed mobility activities, all  (Pended)   -RN     [place] Level/Cues Needed (Bed Mobility Goal 1, PT)  independent  (Pended)   -RN     Time Frame (Bed Mobility Goal 1, PT)  short term goal (STG)  (Pended)   -RN     Progress/Outcomes (Bed Mobility Goal 1, PT)  goal met  (Pended)   -RN     [facility] Name 02/05/21 1651          Transfer Goal 1 (PT)    Activity/Assistive Device  (Transfer Goal 1, PT)  transfers, all  (Pended)   -RN     [place] Level/Cues Needed (Transfer Goal 1, PT)  independent  (Pended)   -RN     Time Frame (Transfer Goal 1, PT)  short term goal (STG)  (Pended)   -RN     Progress/Outcome (Transfer Goal 1, PT)  goal met  (Pended)   -RN     Row Name 02/05/21 1651          Gait Training Goal 1 (PT)    Activity/Assistive Device (Gait Training Goal 1, PT)  gait (walking locomotion);backward stepping;sidestepping  (Pended)   -RN     [place] Level (Gait Training Goal 1, PT)  contact guard assist  (Pended)   -RN     Distance (Gait Training Goal 1, PT)  400'  (Pended)   -RN     Strategies/Barriers (Gait Training Goal 1, PT)  see mobility flow sheet  (Pended)   -RN     Progress/Outcome (Gait Training Goal 1, PT)  continuing progress toward goal  (Pended)   -RN     Row Name 02/05/21 1651          Patient Education Goal (PT)    Activity (Patient Education Goal, PT)  HEP  (Pended)   -RN     [place]/Cues/Accuracy (Memory Goal 2, PT)  verbalizes understanding  (Pended)   -RN       User Key  (r) = Recorded By, (t) = Taken By, (c) = Cosigned By    Initials Name Provider Type    RN Lupe Hernandez, PT Student PT Student        Clinical Impression     Row Name 02/05/21 1637          Pain    Additional Documentation  Pain Scale: FACES Pre/Post-Treatment (Group)  (Pended)   -RN     Row Name 02/05/21 1637          Pain Scale: Numbers Pre/Post-Treatment    Pain Intervention(s)  --  (Pended)   -RN     Row Name 02/05/21 1637          Pain Scale: FACES Pre/Post-Treatment    Pain: FACES Scale, Pretreatment  0-->no hurt  (Pended)   -RN     Posttreatment Pain Rating  0-->no hurt  (Pended)   -RN     Row Name 02/05/21 1637          Plan of Care Review    Plan of Care Reviewed With  patient  (Pended)   -RN     Progress  improving  (Pended)   -RN     Outcome Summary  Pt demonstrates BUE and BLE tremor with activity. Pt requires min A with tandem walking. Pt ambulated 400' with CGA. Pt  complains of anxiety.  NS is allowing pt up at [place] in room.  (Pended)   -RN     Row Name 02/05/21 1637          Therapy Assessment/Plan (PT)    Rehab Potential (PT)  good, to achieve stated therapy goals  (Pended)   -RN     Criteria for Skilled Interventions Met (PT)  skilled treatment is necessary;yes  (Pended)   -RN     Row Name 02/05/21 1637          Vital Signs    Post Systolic BP Rehab  116  (Pended)   -RN     Post Treatment Diastolic BP  96  (Pended)   -RN     Pretreatment Heart Rate (beats/min)  133  (Pended)   -RN     Posttreatment Heart Rate (beats/min)  107  (Pended)   -RN     Posttreatment Resp Rate (breaths/min)  --  (Pended)   -RN     Post SpO2 (%)  96  (Pended)   -RN     O2 Delivery Post Treatment  room air  (Pended)   -RN     Pre Patient Position  Supine  (Pended)   -RN     Intra Patient Position  Standing  (Pended)   -RN     Post Patient Position  Supine  (Pended)   -RN     Row Name 02/05/21 1637          Positioning and Restraints    Pre-Treatment Position  in bed  (Pended)   -RN     Post Treatment Position  bed  (Pended)   -RN     In Bed  supine;call light within reach;encouraged to call for assist  (Pended)   -RN       User Key  (r) = Recorded By, (t) = Taken By, (c) = Cosigned By    Initials Name Provider Type    RN Lupe Hernandez, PT Student PT Student        Outcome Measures     Row Name 02/05/21 1656          How much help from another person do you currently need...    Turning from your back to your side while in flat bed without using bedrails?  4  (Pended)   -RN     Moving from lying on back to sitting on the side of a flat bed without bedrails?  4  (Pended)   -RN     Moving to and from a bed to a chair (including a wheelchair)?  4  (Pended)   -RN     Standing up from a chair using your arms (e.g., wheelchair, bedside chair)?  4  (Pended)   -RN     Climbing 3-5 steps with a railing?  3  (Pended)   -RN     To walk in hospital room?  4  (Pended)   -RN     AM-PAC 6 Clicks Score (PT)  23   (Pended)   -RN     Row Name 02/05/21 1656          Functional Assessment    Outcome Measure Options  AM-PAC 6 Clicks Basic Mobility (PT)  (Pended)   -RN       User Key  (r) = Recorded By, (t) = Taken By, (c) = Cosigned By    Initials Name Provider Type    RN Lupe Hernandez, PT Student PT Student        Physical Therapy Education                 Title: PT OT SLP Therapies (In Progress)     Topic: Physical Therapy (Done)     Point: Mobility training (Done)     Learning Progress Summary           Patient Eager, E,D, VU,DU by RN at 2/5/2021 1658    Comment: Pt was given purpose and benefits of exercise. Pt was encouraged to walk in room as needed while communicating with NSG on [place] status.    Acceptance, E,D, VU by MB at 2/3/2021 1414                   Point: Home exercise program (Done)     Learning Progress Summary           Patient Eager, E,D, VU,DU by RN at 2/5/2021 1658    Comment: Pt was given purpose and benefits of exercise. Pt was encouraged to walk in room as needed while communicating with NSG on [place] status.    Acceptance, E,D, VU by MB at 2/3/2021 1414                   Point: Body mechanics (Done)     Learning Progress Summary           Patient Eager, E,D, VU,DU by RN at 2/5/2021 1658    Comment: Pt was given purpose and benefits of exercise. Pt was encouraged to walk in room as needed while communicating with NSG on [place] status.    Acceptance, E,D, VU by MB at 2/3/2021 1414                   Point: Precautions (Done)     Learning Progress Summary           Patient Eager, E,D, VU,DU by RN at 2/5/2021 1658    Comment: Pt was given purpose and benefits of exercise. Pt was encouraged to walk in room as needed while communicating with NSG on [place] status.    Acceptance, E,D, VU by MB at 2/3/2021 1414                               User Key     Initials Effective Dates Name Provider Type Discipline    MB 03/14/16 -  Yanet Ayala, PT Physical Therapist PT    RN 07/21/20 -  Lupe Hernandez, PT Student PT Student  PT              PT Recommendation and Plan  Planned Therapy Interventions (PT): (P) balance training, bed mobility training, gait training, home exercise program, motor coordination training  Plan of Care Reviewed With: (P) patient  Progress: (P) improving  Outcome Summary: (P) Pt demonstrates BUE and BLE tremor with activity. Pt requires min A with tandem walking. Pt ambulated 400' with CGA. Pt complains of anxiety.  NS is allowing pt up at [place] in room.     Time Calculation:   PT Charges     Row Name 02/05/21 1701             Time Calculation    Start Time  1557  (Pended)   -RN         Time Calculation- PT    Total Timed Code Minutes- PT  22 minute(s)  (Pended)   -RN         Timed Charges    50728 - Gait Training Minutes   12  (Pended)   -RN      08951 - PT Therapeutic Activity Minutes  10  (Pended)   -RN        User Key  (r) = Recorded By, (t) = Taken By, (c) = Cosigned By    Initials Name Provider Type    Lupe Leon, PT Student PT Student        Therapy Charges for Today     Code Description Service Date Service Provider Modifiers Qty    [id number] HC GAIT TRAINING EA 15 MIN 2/5/2021 Lupe Hernandez, PT Student GP 1          PT G-Codes  Outcome Measure Options: (P) AM-PAC 6 Clicks Basic Mobility (PT)  AM-PAC 6 Clicks Score (PT): (P) 23  AM-PAC 6 Clicks Score (OT): 22    Lupe Hernandez, PT Student  2/5/2021

## 2021-02-05 NOTE — PROGRESS NOTES
Continued Stay Note  [facility]     Patient Name: Tierney Mas  MRN: [id number]  Today's Date: 2/5/2021    Admit Date: 2/2/2021    Discharge Plan     Row Name 02/05/21 1400       Plan    Plan Comments  Pt plan remains to discharge home when medically ready. SW spoke with pt at bedside. SW had scheduled pt for outpatient psychiatry with [facility] in [place]. Pt has the earliest appointment available on Thursday February 11th, at 9:00am. Pt has been updated of this appointment and agreeable to this. Pt explained that her children are coming up this weekend and she is looking forward to seeing them. Pt had no other ss needs at this time.    Row Name        Plan    Plan     Plan Comments     Final Discharge Disposition Code         Discharge Codes    No documentation.             HARIS Duffy

## 2021-02-05 NOTE — PROGRESS NOTES
Continued Stay Note  [facility]     Patient Name: Tierney Mas  MRN: [id number]  Today's Date: 2/5/2021    Admit Date: 2/2/2021    Discharge Plan     Row Name 02/05/21 1259       Plan    Plan  Home    Plan Comments  I spoke with patient this am. She is up in the room independently. She tells me she will have transportation to home. [facility] physician arranged through the [facility] and appointment is in the AVS.    Final Discharge Disposition Code  01 - home or self-care        Discharge Codes    No documentation.             Gloria Gordon RN

## 2021-02-05 NOTE — TELEPHONE ENCOUNTER
Caller: [facility]    Relationship to patient:     Best call back number: [phone]    New or established patient?  [x] New  [] Established    Date of discharge: 02/06/21    Facility discharged from: [facility]    Diagnosis/Symptoms: ALCOHOL WITHDRAW    Length of stay (If applicable): 02/02/21-02/06/21    Specialty Only: Did you see a [facility] provider?    [] Yes  [] No  If so, who?

## 2021-02-05 NOTE — PLAN OF CARE
Goal Outcome Evaluation:  Plan of Care Reviewed With: (P) patient  Progress: (P) improving  Outcome Summary: (P) Pt demonstrates BUE and BLE tremor with activity. Pt requires min A with tandem walking. Pt ambulated 400' with CGA. Pt complains of anxiety. Discussed with NSG pt can be up at [place].

## 2021-02-05 NOTE — PLAN OF CARE
Goal Outcome Evaluation:   VSS on RA. A&O. CIWA, PRN ativan given per orders. Denies any needs at this time.

## 2021-02-05 NOTE — PROGRESS NOTES
[facility] Medicine Services  PROGRESS NOTE    Patient Name: Tierney Mas  : 1965  MRN: [id number]    Date of Admission: 2021  Primary Care Physician: Provider, No Known    Subjective   Subjective     CC:  ETOH withdrawal     HPI:  Patient is resting in bed in NAD. Appetite poor but no N/V. Tremors have improved but patient still feeling very anxious and nervous about going home. Tearful when talking to me. No acute events overnight per nursing.     ROS:  Gen- No fevers, chills  CV- No chest pain, palpitations  Resp- No cough, dyspnea  GI- No N/V/D, abd pain        Objective   Objective     Vital Signs:   Temp:  [97.7 °F (36.5 °C)-99.3 °F (37.4 °C)] 98.2 °F (36.8 °C)  Heart Rate:  [] 73  Resp:  [16-18] 18  BP: (122-132)/(81-96) 129/94        Physical Exam:  Constitutional: No acute distress, awake, alert  HENT: NCAT, mucous membranes moist  Respiratory: Clear to auscultation bilaterally, respiratory effort normal room air   Cardiovascular: RRR, no murmurs, rubs, or gallops  Gastrointestinal: Positive bowel sounds, soft, nontender, nondistended  Musculoskeletal: No bilateral ankle edema  Psychiatric: Appropriate affect, cooperative, anxious   Neurologic: Oriented x 3, strength symmetric in all extremities, Cranial Nerves grossly intact to confrontation, speech clear, visible tremors improved  Skin: No rashes      Results Reviewed:  Results from last 7 days   Lab Units 21  0648 21  1635   WBC 10*3/mm3 5.45 3.72   HEMOGLOBIN g/dL 14.1 15.3   HEMATOCRIT % 42.3 45.6   PLATELETS 10*3/mm3 128* 146     Results from last 7 days   Lab Units 21  0648 21  1635   SODIUM mmol/L 141 138   POTASSIUM mmol/L 4.0 4.1   CHLORIDE mmol/L 103 94*   CO2 mmol/L 27.0 29.0   BUN mg/dL 11 9   CREATININE mg/dL 0.75 0.82   GLUCOSE mg/dL 100* 111*   CALCIUM mg/dL 9.3 10.8*   ALT (SGPT) U/L 180* 73*   AST (SGOT) U/L 400* 82*     Estimated Creatinine Clearance: 71.3 mL/min (by  C-G formula based on SCr of 0.75 mg/dL).    Microbiology Results Abnormal     Procedure Component Value - Date/Time    COVID PRE-OP / PRE-PROCEDURE SCREENING ORDER (NO ISOLATION) - Swab, Nasopharynx [[id number]]  (Normal) Collected: 02/02/21 2358    Lab Status: Final result Specimen: Swab from Nasopharynx Updated: 02/03/21 0143    Narrative:      The following orders were created for panel order COVID PRE-OP / PRE-PROCEDURE SCREENING ORDER (NO ISOLATION) - Swab, Nasopharynx.  Procedure                               Abnormality         Status                     ---------                               -----------         ------                     COVID-19 and FLU A/B PCR...[[id number]]  Normal              Final result                 Please view results for these tests on the individual orders.    COVID-19 and FLU A/B PCR - Swab, Nasopharynx [[id number]]  (Normal) Collected: 02/02/21 2358    Lab Status: Final result Specimen: Swab from Nasopharynx Updated: 02/03/21 0143     COVID19 Not Detected     Influenza A PCR Not Detected     Influenza B PCR Not Detected    Narrative:      Fact sheet for providers: https://www.fda.gov/media/766552/download    Fact sheet for patients: https://www.fda.gov/media/208884/download    Test performed by PCR.          Imaging Results (Last 24 Hours)     ** No results found for the last 24 hours. **               I have reviewed the medications:  Scheduled Meds:escitalopram, 20 mg, Oral, Daily  folic acid, 1 mg, Oral, Daily  hydrOXYzine, 25 mg, Oral, Q8H  multivitamin with minerals, 1 tablet, Oral, Daily  nicotine, 1 patch, Transdermal, Q24H  sodium chloride, 10 mL, Intravenous, Q12H  thiamine, 100 mg, Oral, Daily      Continuous Infusions:   PRN Meds:.•  acetaminophen **OR** acetaminophen **OR** acetaminophen  •  diazePAM  •  LORazepam **OR** [DISCONTINUED] LORazepam **OR** LORazepam **OR** [DISCONTINUED] LORazepam **OR** [DISCONTINUED] LORazepam **OR** [DISCONTINUED] LORazepam  •   ondansetron  •  Sodium Chloride (PF)  •  sodium chloride    Assessment/Plan   Assessment & Plan     Active Hospital Problems    Diagnosis  POA   • **Alcohol withdrawal (CMS/HCC) [F10.239]  Yes   • Depression [F32.9]  Unknown   • Suicidal ideations [R45.851]  Not Applicable   • Smoking [F17.200]  Yes   • Alcohol abuse [F10.10]  Yes   • Alcohol withdrawal with perceptual disturbances (CMS/HCC) [F10.232]  Yes      Resolved Hospital Problems   No resolved problems to display.        Brief Hospital Course to date:  Tierney Mas is a 55 y.o. female with past medical history significant for anxiety/depression, alcohol abuse, prior suicide ideation who presented with alcohol withdrawal.  Patient had been sober and completed voluntary rehab program in 2014 but relapsed about a year ago.  Over the weekend she had relapse and drank a large amount of whiskey with a friend which was her last drink.  She presented today with thoughts of suicide and EtOH withdrawal after binge this weekend.    Plan was partially entered by my partner and I have reviewed and updated as appropriate on 2/5/21     EtOH withdrawal  Suicidal ideation  -Continue CIWA protocol with prn ativan   -Neurochecks and seizure precautions  -Psych consult, continue 72-hour hold started at midnight 2/3  -Chemical dependency following, patient now interested in outpatient rehab. Long discussion with her regarding need for outside help to remain sober especially if she is alone at home  - telepsych visit 2/3 they recommended inpatient therapy but pt declines.   -- increased lexapro to 20 mg  -- suicide precautions also dc'd 2/4 pt denies any active suicidal thought, intent or plan  -- will have nurse set up pcp and psychiatrist appt today for dc over the weekend         Anxiety/depression  -Continue Lexapro  -- Increase to 20 mg per psychiatry rec's   -- pt still having anxiety and feels if she goes home she will not be able to cope.   -- will start atarax  scheduled and try and get therapist appt for first of week      Tobacco abuse  -Nicotine patch       DVT Prophylaxis:  [facility]       Disposition: I expect the patient to be discharged 1-2 days if symptoms improve     CODE STATUS:   Code Status and Medical Interventions:   Ordered at: 02/03/21 0447     Level Of Support Discussed With:    Patient     Code Status:    CPR     Medical Interventions (Level of Support Prior to Arrest):    Full       Hallie Jaime, APRN  02/05/21

## 2021-02-06 VITALS
BODY MASS INDEX: 20.06 KG/M2 | HEART RATE: 97 BPM | HEIGHT: 64 IN | TEMPERATURE: 98 F | SYSTOLIC BLOOD PRESSURE: 128 MMHG | DIASTOLIC BLOOD PRESSURE: 86 MMHG | WEIGHT: 117.5 LBS | RESPIRATION RATE: 18 BRPM | OXYGEN SATURATION: 99 %

## 2021-02-06 PROBLEM — R45.851 SUICIDAL IDEATIONS: Status: RESOLVED | Noted: 2021-02-03 | Resolved: 2021-02-06

## 2021-02-06 PROBLEM — F10.939 ALCOHOL WITHDRAWAL (HCC): Status: RESOLVED | Noted: 2021-02-02 | Resolved: 2021-02-06

## 2021-02-06 PROCEDURE — 99239 HOSP IP/OBS DSCHRG MGMT >30: CPT | Performed by: NURSE PRACTITIONER

## 2021-02-06 RX ORDER — FOLIC ACID 1 MG/1
1 TABLET ORAL DAILY
Qty: 30 TABLET | Refills: 0 | Status: SHIPPED | OUTPATIENT
Start: 2021-02-06 | End: 2021-03-08 | Stop reason: SDUPTHER

## 2021-02-06 RX ORDER — MULTIPLE VITAMINS W/ MINERALS TAB 9MG-400MCG
1 TAB ORAL DAILY
Qty: 30 TABLET | Refills: 0 | Status: SHIPPED | OUTPATIENT
Start: 2021-02-06 | End: 2021-03-08 | Stop reason: SDUPTHER

## 2021-02-06 RX ORDER — ACETAMINOPHEN 325 MG/1
650 TABLET ORAL EVERY 6 HOURS PRN
Start: 2021-02-06

## 2021-02-06 RX ORDER — HYDROXYZINE HYDROCHLORIDE 25 MG/1
25 TABLET, FILM COATED ORAL EVERY 8 HOURS
Qty: 45 TABLET | Refills: 0 | Status: SHIPPED | OUTPATIENT
Start: 2021-02-06 | End: 2021-02-10 | Stop reason: SDUPTHER

## 2021-02-06 RX ORDER — ESCITALOPRAM OXALATE 20 MG/1
20 TABLET ORAL DAILY
Qty: 30 TABLET | Refills: 0 | Status: SHIPPED | OUTPATIENT
Start: 2021-02-06 | End: 2021-02-10 | Stop reason: SDUPTHER

## 2021-02-06 RX ORDER — NICOTINE 21 MG/24HR
1 PATCH, TRANSDERMAL 24 HOURS TRANSDERMAL
Qty: 30 PATCH | Refills: 0 | Status: SHIPPED | OUTPATIENT
Start: 2021-02-06 | End: 2021-02-10

## 2021-02-06 RX ADMIN — FOLIC ACID 1 MG: 1 TABLET ORAL at 10:09

## 2021-02-06 RX ADMIN — THIAMINE HCL TAB 100 MG 100 MG: 100 TAB at 10:09

## 2021-02-06 RX ADMIN — Medication 1 PATCH: at 04:31

## 2021-02-06 RX ADMIN — DIAZEPAM 2 MG: 2 TABLET ORAL at 10:10

## 2021-02-06 RX ADMIN — HYDROXYZINE HYDROCHLORIDE 25 MG: 25 TABLET, FILM COATED ORAL at 11:53

## 2021-02-06 RX ADMIN — HYDROXYZINE HYDROCHLORIDE 25 MG: 25 TABLET, FILM COATED ORAL at 04:31

## 2021-02-06 RX ADMIN — ESCITALOPRAM OXALATE 20 MG: 20 TABLET ORAL at 10:09

## 2021-02-06 RX ADMIN — MULTIPLE VITAMINS W/ MINERALS TAB 1 TABLET: TAB at 10:08

## 2021-02-06 NOTE — DISCHARGE SUMMARY
[facility] Medicine Services  DISCHARGE SUMMARY    Patient Name: Tierney Mas  : 1965  MRN: [id number]    Date of Admission: 2021  Date of Discharge:  21  Primary Care Physician: Provider, No Known    Consults     Date and Time Order Name Status Description    2/3/2021 1542 Inpatient Psychiatrist Consult Completed         Hospital Course     Presenting Problem:   Alcohol withdrawal with perceptual disturbances (CMS/HCC) [F10.232]  Alcohol withdrawal with perceptual disturbances (CMS/HCC) [F10.232]    Active Hospital Problems    Diagnosis  POA   • Depression [F32.9]  Yes   • Smoking [F17.200]  Yes   • Alcohol abuse [F10.10]  Yes   • Alcohol withdrawal with perceptual disturbances (CMS/HCC) [F10.232]  Yes      Resolved Hospital Problems    Diagnosis Date Resolved POA   • **Alcohol withdrawal (CMS/HCC) [F10.239] 2021 Yes   • Suicidal ideations [R45.851] 2021 Not Applicable      Hospital Course:  Tierney Mas is a 55 y.o. female past medical history significant for anxiety/depression, alcohol abuse, prior suicide ideation who presented with alcohol withdrawal.  Patient had been sober and completed voluntary rehab program in  but relapsed about a year ago.  Over the weekend she had relapse and drank a large amount of whiskey with a friend which was her last drink.  She presented today with thoughts of suicide and EtOH withdrawal after binge this weekend.    Discharge Follow Up Recommendations for labs/diagnostics:  -F/U with PCP in 1 week  -F/U with Boby Castellanos from [facility] per Telemedicine on  at 9 am.     Day of Discharge     HPI:   Pt resting in bed, A&OX3, NAD. Denies visual or auditory hallucinations, nausea, vomiting, headache or dizziness.     Review of Systems  Gen- No fevers, chills  CV- No chest pain, palpitations  Resp- No cough, dyspnea  GI- No N/V/D, abd pain  Otherwise ROS is negative except as mentioned in the HPI.    Vital Signs:      Temp:  [97.4 °F (36.3 °C)-98.2 °F (36.8 °C)] 97.4 °F (36.3 °C)  Heart Rate:  [] 70  Resp:  [14-18] 16  BP: (110-118)/(73-92) 111/88     Physical Exam:  Constitutional: Awake, alert  Eyes: PERRLA, sclerae anicteric, no conjunctival injection  HENT: NCAT, mucous membranes moist  Neck: Supple, no thyromegaly, no lymphadenopathy, trachea midline  Respiratory: Clear to auscultation bilaterally, nonlabored respirations   Cardiovascular: RRR, no murmurs, rubs, or gallops, palpable pedal pulses bilaterally  Gastrointestinal: Positive bowel sounds, soft, nontender, nondistended  Musculoskeletal: No bilateral ankle edema, no clubbing or cyanosis to extremities  Psychiatric: Appropriate affect, cooperative  Neurologic: Oriented x 3, strength symmetric in all extremities, Cranial Nerves grossly intact to confrontation, speech clear  Skin: No rashes    EtOH withdrawal-resolved  Suicidal ideation-denies resolved  -Continue CIWA protocol with prn ativan   -Neurochecks and seizure precautions  -Psych consult, continue 72-hour hold started at midnight 2/3  -Chemical dependency following, patient now interested in outpatient rehab. Long discussion with her regarding need for outside help to remain sober especially if she is alone at home  - telepsych visit 2/3 they recommended inpatient therapy but pt declines.   -- increased lexapro to 20 mg  -- suicide precautions also dc'd 2/4 pt denies any active suicidal thought, intent or plan  -- F/U with PCP in 1 week, F/U with psychiatry Boby Castellanos from [facility] per Telemedicine on 02/11 at 9 am.      Anxiety/depression- ongoing  -- Increased lexapro  to 20 mg per psychiatry rec's   -- will start atarax scheduled- F/U with  psychiatry Boby Castellanos from [facility] per Telemedicine on 02/11 at 9 am.      Tobacco abuse-ongoing  -Nicotine patch     Pertinent  and/or Most Recent Results     Results from last 7 days   Lab Units 02/03/21  0648 02/02/21  1635   WBC 10*3/mm3 5.45 3.72    HEMOGLOBIN g/dL 14.1 15.3   HEMATOCRIT % 42.3 45.6   PLATELETS 10*3/mm3 128* 146   SODIUM mmol/L 141 138   POTASSIUM mmol/L 4.0 4.1   CHLORIDE mmol/L 103 94*   CO2 mmol/L 27.0 29.0   BUN mg/dL 11 9   CREATININE mg/dL 0.75 0.82   GLUCOSE mg/dL 100* 111*   CALCIUM mg/dL 9.3 10.8*     Results from last 7 days   Lab Units 02/03/21  0648 02/02/21  1635   BILIRUBIN mg/dL 0.9 0.8   ALK PHOS U/L 87 108   ALT (SGPT) U/L 180* 73*   AST (SGOT) U/L 400* 82*           Invalid input(s): TG, LDLCALC, LDLREALC  Results from last 7 days   Lab Units 02/02/21 2032 02/02/21  1635   LACTATE mmol/L 0.9 4.0*       Brief Urine Lab Results  (Last result in the past 365 days)      Color   Clarity   Blood   Leuk Est   Nitrite   Protein   CREAT   Urine HCG        02/02/21 1819               Negative           Microbiology Results Abnormal     Procedure Component Value - Date/Time    COVID PRE-OP / PRE-PROCEDURE SCREENING ORDER (NO ISOLATION) - Swab, Nasopharynx [[id number]]  (Normal) Collected: 02/02/21 2358    Lab Status: Final result Specimen: Swab from Nasopharynx Updated: 02/03/21 0143    Narrative:      The following orders were created for panel order COVID PRE-OP / PRE-PROCEDURE SCREENING ORDER (NO ISOLATION) - Swab, Nasopharynx.  Procedure                               Abnormality         Status                     ---------                               -----------         ------                     COVID-19 and FLU A/B PCR...[[id number]]  Normal              Final result                 Please view results for these tests on the individual orders.    COVID-19 and FLU A/B PCR - Swab, Nasopharynx [[id number]]  (Normal) Collected: 02/02/21 2358    Lab Status: Final result Specimen: Swab from Nasopharynx Updated: 02/03/21 0143     COVID19 Not Detected     Influenza A PCR Not Detected     Influenza B PCR Not Detected    Narrative:      Fact sheet for providers: https://www.fda.gov/media/528260/download    Fact sheet for patients:  https://www.fda.gov/media/844450/download    Test performed by PCR.          Imaging Results (All)     Procedure Component Value Units Date/Time    XR Chest 1 View [[id number]] Collected: 02/03/21 0830     Updated: 02/03/21 0834    Narrative:      EXAMINATION: XR CHEST 1 VW-      INDICATION: anxiety, etoh withdrawal; F10.232-Alcohol dependence with  withdrawal with perceptual disturbance; F10.10-Alcohol abuse,  uncomplicated; R03.0-Elevated blood-pressure reading, without diagnosis  of hypertension; F32.9-Major depressive disorder, single episode,  unspecified; R45.851-Suicidal ideations      COMPARISON: NONE     FINDINGS: No focal airspace opacity. No pleural effusion or  pneumothorax. Normal heart and mediastinal contours.       Impression:      No evidence of acute disease in the chest.      This report was finalized on 2/3/2021 8:31 AM by Jatinder Mccullough.             Discharge Details        Discharge Medications      New Medications      Instructions Start Date   acetaminophen 325 MG tablet  Commonly known as: TYLENOL   650 mg, Oral, Every 6 Hours PRN      folic acid 1 MG tablet  Commonly known as: FOLVITE   1 mg, Oral, Daily      hydrOXYzine 25 MG tablet  Commonly known as: ATARAX   25 mg, Oral, Every 8 Hours      multivitamin with minerals tablet tablet   1 tablet, Oral, Daily      nicotine 21 MG/24HR patch  Commonly known as: NICODERM CQ   1 patch, Transdermal, Every 24 Hours Scheduled      thiamine 100 MG tablet  tablet  Commonly known as: VITAMIN B-1   100 mg, Oral, Daily         Changes to Medications      Instructions Start Date   escitalopram 20 MG tablet  Commonly known as: LEXAPRO  What changed:   · medication strength  · how much to take   20 mg, Oral, Daily           No Known Allergies    Discharge Disposition:  Home or Self Care    Discharge Diet:  Diet Order   Procedures   • Diet Regular     Discharge Activity:   Activity Instructions     Activity as Tolerated          CODE STATUS:    Code Status  and Medical Interventions:   Ordered at: 02/03/21 0447     Level Of Support Discussed With:    Patient     Code Status:    CPR     Medical Interventions (Level of Support Prior to Arrest):    Full     Future Appointments   Date Time Provider Department Center   2/10/2021 11:00 AM Christiano, Kharri, PA [facility]       Additional Instructions for the Follow-ups that You Need to Schedule     Call MD With Problems / Concerns   As directed      Instructions: Call MD for temp >100.4, nausea, vomiting, diarrhea, chest pain, heart palpitations, shortness of breath, suicidal ideation or homicidal ideation.    Order Comments: Instructions: Call MD for temp >100.4, nausea, vomiting, diarrhea, chest pain, heart palpitations, shortness of breath, suicidal ideation or homicidal ideation.          Discharge Follow-up with PCP   As directed       Currently Documented PCP:    Provider, No Known    PCP Phone Number:    None     Follow Up Details: pcp follow up one week         Discharge Follow-up with PCP   As directed       Currently Documented PCP:    Provider, No Known    PCP Phone Number:    None     Follow Up Details: F/U with PCP in 1 week         Discharge Follow-up with Specified Provider: Boby Castellanos   As directed      To: Boby Castellanos    Follow [facility] Details: [phone]         Discharge Follow-up with Specified Provider: [facility]   As directed      To: follow up with psychiatrist at  [facility]             Time Spent on Discharge:  50 minutes    Electronically signed by DELIA Gomez, 02/06/21, 9:57 AM EST.

## 2021-02-07 ENCOUNTER — READMISSION MANAGEMENT (OUTPATIENT)
Dept: CALL CENTER | Facility: HOSPITAL | Age: 56
End: 2021-02-07

## 2021-02-07 NOTE — OUTREACH NOTE
Prep Survey      Responses   [facility] patient discharged from?  [place]   Is LACE score < 7 ?  Yes   Emergency Room discharge w/ pulse ox?  No   Eligibility  [facility]   Date of Admission  02/02/21   Date of Discharge  02/06/21   Discharge Disposition  Home or Self Care   Discharge diagnosis  depression,  alcohol withdrawal,  suicidal ideations   Does the patient have one of the following disease processes/diagnoses(primary or secondary)?  Other   Does the patient have Home health ordered?  No   Is there a DME ordered?  No   Prep survey completed?  Yes          Viky Augustine RN

## 2021-02-08 ENCOUNTER — TRANSITIONAL CARE MANAGEMENT TELEPHONE ENCOUNTER (OUTPATIENT)
Dept: CALL CENTER | Facility: HOSPITAL | Age: 56
End: 2021-02-08

## 2021-02-08 NOTE — OUTREACH NOTE
Call Center TCM Note      Responses   [facility] patient discharged from?  [place]   Does the patient have one of the following disease processes/diagnoses(primary or secondary)?  Other   TCM attempt successful?  Yes   Call start time  1657   Call end time  1702   Discharge diagnosis  depression,  alcohol withdrawal,  suicidal ideations   Meds reviewed with patient/caregiver?  Yes   Is the patient having any side effects they believe may be caused by any medication additions or changes?  No   Does the patient have all medications ordered at discharge?  Yes   Is the patient taking all medications as directed (includes completed medication regime)?  No   Medication comments  States she did not get the Nicoderm because she is not interested in stop smoking.  State she hasn't gotten the Vit B1 yet.   Does the patient have a primary care provider?   Yes   Does the patient have an appointment with their PCP within 7 days of discharge?  Yes   Comments regarding PCP  Has New PCP appt with Deedenyvanna Alvarado on Feb 10 @ 11:00   Has the patient kept scheduled appointments due by today?  N/A   Comments  Pt was unaware of new pt appt with new PCP.  Address, phone number, date and time given   Has home health visited the patient within 72 hours of discharge?  N/A   Psychosocial issues?  No   Did the patient receive a copy of their discharge instructions?  Yes   Nursing interventions  Reviewed instructions with patient   What is the patient's perception of their health status since discharge?  Improving   Is the patient/caregiver able to teach back signs and symptoms related to disease process for when to call PCP?  Yes   Is the patient/caregiver able to teach back signs and symptoms related to disease process for when to call 911?  Yes   Is the patient/caregiver able to teach back the hierarchy of who to call/visit for symptoms/problems? PCP, Specialist, Home health nurse, Urgent Care, ED, 911  Yes   If the patient is a  current smoker, are they able to teach back resources for cessation?  Smoking cessation medications [States she is not interested in stopping at this time]   Additional teach back comments  States she is doing well.  Has appt with [facility] and was given information on new PCP   TCM call completed?  Yes   Wrap up additional comments  Pt to follow up with new PCP          Deirdre العلي LPN    2/8/2021, 17:06 EST

## 2021-02-10 ENCOUNTER — HOSPITAL ENCOUNTER (OUTPATIENT)
Dept: GENERAL RADIOLOGY | Facility: HOSPITAL | Age: 56
Discharge: HOME | End: 2021-02-10
Admitting: PHYSICIAN ASSISTANT

## 2021-02-10 ENCOUNTER — OFFICE VISIT (OUTPATIENT)
Dept: FAMILY MEDICINE CLINIC | Facility: CLINIC | Age: 56
End: 2021-02-10

## 2021-02-10 VITALS
SYSTOLIC BLOOD PRESSURE: 118 MMHG | RESPIRATION RATE: 18 BRPM | DIASTOLIC BLOOD PRESSURE: 68 MMHG | HEIGHT: 64 IN | WEIGHT: 111 LBS | BODY MASS INDEX: 18.95 KG/M2 | TEMPERATURE: 98.2 F | HEART RATE: 78 BPM

## 2021-02-10 DIAGNOSIS — M79.672 LEFT FOOT PAIN: ICD-10-CM

## 2021-02-10 DIAGNOSIS — F10.20 ALCOHOL USE DISORDER, MODERATE, DEPENDENCE (HCC): ICD-10-CM

## 2021-02-10 DIAGNOSIS — Z13.6 ENCOUNTER FOR LIPID SCREENING FOR CARDIOVASCULAR DISEASE: ICD-10-CM

## 2021-02-10 DIAGNOSIS — R73.9 ELEVATED BLOOD SUGAR: ICD-10-CM

## 2021-02-10 DIAGNOSIS — Z13.220 ENCOUNTER FOR LIPID SCREENING FOR CARDIOVASCULAR DISEASE: ICD-10-CM

## 2021-02-10 DIAGNOSIS — E55.9 VITAMIN D DEFICIENCY: ICD-10-CM

## 2021-02-10 DIAGNOSIS — Z09 HOSPITAL DISCHARGE FOLLOW-UP: Primary | ICD-10-CM

## 2021-02-10 DIAGNOSIS — F41.8 DEPRESSION WITH ANXIETY: ICD-10-CM

## 2021-02-10 DIAGNOSIS — R53.82 CHRONIC FATIGUE: ICD-10-CM

## 2021-02-10 PROCEDURE — 73630 X-RAY EXAM OF FOOT: CPT

## 2021-02-10 PROCEDURE — 99495 TRANSJ CARE MGMT MOD F2F 14D: CPT | Performed by: PHYSICIAN ASSISTANT

## 2021-02-10 RX ORDER — ESCITALOPRAM OXALATE 20 MG/1
20 TABLET ORAL DAILY
Qty: 30 TABLET | Refills: 0 | Status: SHIPPED | OUTPATIENT
Start: 2021-02-10

## 2021-02-10 RX ORDER — HYDROXYZINE HYDROCHLORIDE 25 MG/1
25 TABLET, FILM COATED ORAL EVERY 6 HOURS PRN
Qty: 120 TABLET | Refills: 0 | Status: SHIPPED | OUTPATIENT
Start: 2021-02-10 | End: 2021-03-08

## 2021-02-10 NOTE — PROGRESS NOTES
"Transitional Care Follow Up Visit  Subjective     Tierney Mas is a 55 y.o. female who presents for a transitional care management visit.    Within 48 business hours after discharge our office contacted her via telephone to coordinate her care and needs.      I reviewed and discussed the details of that call along with the discharge summary, hospital problems, inpatient lab results, inpatient diagnostic studies, and consultation reports with Tierney.     Current outpatient and discharge medications have been reconciled for the patient.  Reviewed by: QUENTIN Palacios      Date of TCM Phone Call 2/7/2021   [facility]   Date of Admission 2/2/2021   Date of Discharge 2/6/2021   Discharge Disposition Home or Self Care     Risk for Readmission (LACE) Score: 7 (2/6/2021  6:00 AM)      History of Present Illness   Course During Hospital Stay:    Per MD d/c summary:    \"Tierney Mas is a 55 y.o. female past medical history significant for anxiety/depression, alcohol abuse, prior suicide ideation who presented with alcohol withdrawal.  Patient had been sober and completed voluntary rehab program in 2014 but relapsed about a year ago.  Over the weekend she had relapse and drank a large amount of whiskey with a friend which was her last drink.  She presented today with thoughts of suicide and EtOH withdrawal after binge this weekend.\"      Has not drank alcohol since getting out of the hospital. Has appointment with psychiatry tomorrow which she plans on attending   Has been on lexapro for a while. Does recently increased in ER. Hx of depression. ATARAX initiated for anxiety   Been under more stress in the last couple of years  Went through a bad drawn out divorce since 2016. Custody issues with her children   16 and 18 year old children living with their father right now in [place]. This has been really hard on patient   Has two older adult children   Son and sister have been staying " with her since her hospitalization, but does typically live alone   Liver enzymes noted to be elevated in hospital   Denies any SI   Not ready to quit smoking at this time. Having too much stress   Friend who she binged drank with also struggles with substance use issues in the past. This was just not a good situation for patient   She notes prior to that weekend, she was just drinking lightly   Some substance dependency in her family   Refused inpatient therapy or rehab. 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 Completed in the last year   Father has diabetes, PKD    [place] foot ankle   December 15th L 4th digit had surgery for bony fragment  Has noticed pain along first joint and some swelling. No recent injury   No hx of gout. No warmth or redness   Has improved some  Had previous bunion surgery on this joint           The following portions of the patient's history were reviewed and updated as appropriate: allergies, current medications, past family history, past medical history, past social history, past surgical history and problem list.    Review of Systems   Constitutional: Positive for fatigue. Negative for chills, diaphoresis and fever.   HENT: Negative.  Negative for congestion, ear discharge, ear pain, hearing loss, nosebleeds, postnasal drip, sinus pressure, sneezing and sore throat.    Eyes: Negative.    Respiratory: Negative.  Negative for cough, chest tightness, shortness of breath and wheezing.    Cardiovascular: Negative.  Negative for chest pain, palpitations and leg swelling.   Gastrointestinal: Negative for abdominal distention, abdominal pain, blood in stool, constipation, diarrhea, nausea and vomiting.   Genitourinary: Negative.  Negative for difficulty urinating, dysuria, flank pain, frequency, hematuria and urgency.   Musculoskeletal: Positive for arthralgias. Negative for back pain,  "gait problem, joint swelling, myalgias, neck pain and neck stiffness.   Skin: Negative.  Negative for color change, pallor, rash and wound.   Neurological: Negative for dizziness, syncope, weakness, light-headedness, numbness and headaches.   Psychiatric/Behavioral: Positive for dysphoric mood. The patient is nervous/anxious.        Objective    Blood pressure 118/68, pulse 78, temperature 98.2 °F (36.8 °C), resp. rate 18, height 162.6 cm (64\"), weight 50.3 kg (111 lb).     Physical Exam  Vitals signs and nursing note reviewed.   Constitutional:       Appearance: She is well-developed.   HENT:      Head: Normocephalic and atraumatic.      Right Ear: External ear normal.      Left Ear: External ear normal.      Nose: Nose normal.      Mouth/Throat:      Pharynx: No oropharyngeal exudate.   Eyes:      Conjunctiva/sclera: Conjunctivae normal.   Neck:      Musculoskeletal: Normal range of motion and neck supple.      Thyroid: No thyromegaly.      Trachea: No tracheal deviation.   Cardiovascular:      Rate and Rhythm: Normal rate and regular rhythm.      Heart sounds: Normal heart sounds.   Pulmonary:      Effort: Pulmonary effort is normal. No respiratory distress.      Breath sounds: Normal breath sounds. No wheezing or rales.   Chest:      Chest wall: No tenderness.   Abdominal:      General: Bowel sounds are normal. There is no distension.      Palpations: Abdomen is soft. There is no mass.      Tenderness: There is no abdominal tenderness. There is no guarding or rebound.      Hernia: No hernia is present.   Musculoskeletal: Normal range of motion.         General: No deformity.      Left foot: Tenderness and bony tenderness present.        Feet:    Lymphadenopathy:      Cervical: No cervical adenopathy.   Skin:     General: Skin is warm and dry.   Neurological:      Mental Status: She is alert and oriented to person, place, and time.   Psychiatric:         Mood and Affect: Mood normal.         Behavior: Behavior " normal.         Thought Content: Thought content normal.         Judgment: Judgment normal.         Assessment/Plan   Diagnoses and all orders for this visit:    1. Hospital discharge follow-up (Primary)    2. Alcohol use disorder, moderate, dependence (CMS/HCC)  -     CBC & Differential  -     Comprehensive Metabolic Panel  -     Vitamin B12  -     Vitamin B1, Whole Blood    3. Vitamin D deficiency  -     Vitamin D 25 Hydroxy    4. Chronic fatigue  -     CBC & Differential  -     Comprehensive Metabolic Panel  -     TSH  -     Vitamin B12  -     Folate  -     Iron Profile    5. Elevated blood sugar  -     Hemoglobin A1c    6. Encounter for lipid screening for cardiovascular disease  -     Lipid Panel    7. Left foot pain  -     XR Foot 3+ View Left    8. Depression with anxiety  -     escitalopram (LEXAPRO) 20 MG tablet; Take 1 tablet by mouth Daily.  Dispense: 30 tablet; Refill: 0  -     hydrOXYzine (ATARAX) 25 MG tablet; Take 1 tablet by mouth Every 6 (Six) Hours As Needed for Anxiety.  Dispense: 120 tablet; Refill: 0      Refill on medication provided  Keep follow up with psych as scheduled tomorrow. Any medication adjustments per their recommendation   Check labs as outlined in plan in one month. Avoid tylenol use due to elevated liver enzymes. Pt counseled on risk of drinking alcohol and liver health and risk of cirrhosis. Pt feels committed to stopping drinking   Will check XR of left foot due to pain and surgery history

## 2021-02-12 NOTE — PROGRESS NOTES
I have reviewed the notes, assessments, and/or procedures performed by Dimitris Alvarado, I concur with her documentation of Tierney Mas.

## 2021-03-08 DIAGNOSIS — F41.8 DEPRESSION WITH ANXIETY: ICD-10-CM

## 2021-03-08 RX ORDER — FOLIC ACID 1 MG/1
1 TABLET ORAL DAILY
Qty: 30 TABLET | Refills: 0 | Status: SHIPPED | OUTPATIENT
Start: 2021-03-08 | End: 2021-03-30

## 2021-03-08 RX ORDER — HYDROXYZINE HYDROCHLORIDE 25 MG/1
TABLET, FILM COATED ORAL
Qty: 120 TABLET | Refills: 0 | Status: SHIPPED | OUTPATIENT
Start: 2021-03-08

## 2021-03-08 RX ORDER — MULTIPLE VITAMINS W/ MINERALS TAB 9MG-400MCG
1 TAB ORAL DAILY
Qty: 30 TABLET | Refills: 0 | Status: SHIPPED | OUTPATIENT
Start: 2021-03-08

## 2021-03-08 NOTE — TELEPHONE ENCOUNTER
PT CALLED REQUESTING A REFILL FOR:    folic acid (FOLVITE) 1 MG tablet    AND     multivitamin with minerals tablet tablet     [facility]/pharmacy #[id number] - [place], [place] - [address] [place] [address] AT [facility] [address] - [phone]  - [phone] FX

## 2021-03-10 ENCOUNTER — LAB (OUTPATIENT)
Dept: FAMILY MEDICINE CLINIC | Facility: CLINIC | Age: 56
End: 2021-03-10

## 2021-03-15 LAB
25(OH)D3+25(OH)D2 SERPL-MCNC: 36.1 NG/ML (ref 30–100)
ALBUMIN SERPL-MCNC: 4.1 G/DL (ref 3.8–4.9)
ALBUMIN/GLOB SERPL: 2.1 {RATIO} (ref 1.2–2.2)
ALP SERPL-CCNC: 102 IU/L (ref 39–117)
ALT SERPL-CCNC: 19 IU/L (ref 0–32)
AST SERPL-CCNC: 26 IU/L (ref 0–40)
BASOPHILS # BLD AUTO: 0.1 X10E3/UL (ref 0–0.2)
BASOPHILS NFR BLD AUTO: 1 %
BILIRUB SERPL-MCNC: 0.3 MG/DL (ref 0–1.2)
BUN SERPL-MCNC: 13 MG/DL (ref 6–24)
BUN/CREAT SERPL: 16 (ref 9–23)
CALCIUM SERPL-MCNC: 9.4 MG/DL (ref 8.7–10.2)
CHLORIDE SERPL-SCNC: 102 MMOL/L (ref 96–106)
CHOLEST SERPL-MCNC: 210 MG/DL (ref 100–199)
CO2 SERPL-SCNC: 21 MMOL/L (ref 20–29)
CREAT SERPL-MCNC: 0.82 MG/DL (ref 0.57–1)
EOSINOPHIL # BLD AUTO: 0.2 X10E3/UL (ref 0–0.4)
EOSINOPHIL NFR BLD AUTO: 4 %
ERYTHROCYTE [DISTWIDTH] IN BLOOD BY AUTOMATED COUNT: 12.5 % (ref 11.7–15.4)
FOLATE SERPL-MCNC: >20 NG/ML
GLOBULIN SER CALC-MCNC: 2 G/DL (ref 1.5–4.5)
GLUCOSE SERPL-MCNC: 86 MG/DL (ref 65–99)
HBA1C MFR BLD: 5.7 % (ref 4.8–5.6)
HCT VFR BLD AUTO: 38.2 % (ref 34–46.6)
HDLC SERPL-MCNC: 69 MG/DL
HGB BLD-MCNC: 12.7 G/DL (ref 11.1–15.9)
IMM GRANULOCYTES # BLD AUTO: 0 X10E3/UL (ref 0–0.1)
IMM GRANULOCYTES NFR BLD AUTO: 0 %
IRON SATN MFR SERPL: 37 % (ref 15–55)
IRON SERPL-MCNC: 112 UG/DL (ref 27–159)
LDLC SERPL CALC-MCNC: 128 MG/DL (ref 0–99)
LYMPHOCYTES # BLD AUTO: 1.5 X10E3/UL (ref 0.7–3.1)
LYMPHOCYTES NFR BLD AUTO: 32 %
MCH RBC QN AUTO: 29.1 PG (ref 26.6–33)
MCHC RBC AUTO-ENTMCNC: 33.2 G/DL (ref 31.5–35.7)
MCV RBC AUTO: 87 FL (ref 79–97)
MONOCYTES # BLD AUTO: 0.6 X10E3/UL (ref 0.1–0.9)
MONOCYTES NFR BLD AUTO: 12 %
NEUTROPHILS # BLD AUTO: 2.3 X10E3/UL (ref 1.4–7)
NEUTROPHILS NFR BLD AUTO: 51 %
PLATELET # BLD AUTO: 275 X10E3/UL (ref 150–450)
POTASSIUM SERPL-SCNC: 4.5 MMOL/L (ref 3.5–5.2)
PROT SERPL-MCNC: 6.1 G/DL (ref 6–8.5)
RBC # BLD AUTO: 4.37 X10E6/UL (ref 3.77–5.28)
SODIUM SERPL-SCNC: 140 MMOL/L (ref 134–144)
TIBC SERPL-MCNC: 302 UG/DL (ref 250–450)
TRIGL SERPL-MCNC: 75 MG/DL (ref 0–149)
TSH SERPL DL<=0.005 MIU/L-ACNC: 0.73 UIU/ML (ref 0.45–4.5)
UIBC SERPL-MCNC: 190 UG/DL (ref 131–425)
VIT B1 BLD-SCNC: 211.9 NMOL/L (ref 66.5–200)
VIT B12 SERPL-MCNC: 514 PG/ML (ref 232–1245)
VLDLC SERPL CALC-MCNC: 13 MG/DL (ref 5–40)
WBC # BLD AUTO: 4.6 X10E3/UL (ref 3.4–10.8)

## 2021-03-30 RX ORDER — FOLIC ACID 1 MG/1
TABLET ORAL
Qty: 30 TABLET | Refills: 0 | Status: SHIPPED | OUTPATIENT
Start: 2021-03-30 | End: 2021-04-22

## 2021-04-22 RX ORDER — FOLIC ACID 1 MG/1
TABLET ORAL
Qty: 30 TABLET | Refills: 0 | Status: SHIPPED | OUTPATIENT
Start: 2021-04-22 | End: 2021-05-17

## 2021-05-17 RX ORDER — FOLIC ACID 1 MG/1
TABLET ORAL
Qty: 30 TABLET | Refills: 0 | Status: SHIPPED | OUTPATIENT
Start: 2021-05-17 | End: 2021-06-10

## 2021-06-10 RX ORDER — FOLIC ACID 1 MG/1
TABLET ORAL
Qty: 30 TABLET | Refills: 0 | Status: SHIPPED | OUTPATIENT
Start: 2021-06-10 | End: 2021-07-07

## 2021-07-07 RX ORDER — FOLIC ACID 1 MG/1
TABLET ORAL
Qty: 30 TABLET | Refills: 0 | Status: SHIPPED | OUTPATIENT
Start: 2021-07-07 | End: 2021-08-11

## 2021-08-11 RX ORDER — FOLIC ACID 1 MG/1
TABLET ORAL
Qty: 30 TABLET | Refills: 0 | Status: SHIPPED | OUTPATIENT
Start: 2021-08-11

## 2023-01-16 NOTE — OUTREACH NOTE
Call Center TCM Note      Responses   [facility] patient discharged from?  [place]   Does the patient have one of the following disease processes/diagnoses(primary or secondary)?  Other   TCM attempt successful?  No   Unsuccessful attempts  Attempt 1          Deirdre العلي LPN    2/8/2021, 08:37 EST      
good minus